# Patient Record
Sex: FEMALE | Race: WHITE | NOT HISPANIC OR LATINO | Employment: FULL TIME | URBAN - METROPOLITAN AREA
[De-identification: names, ages, dates, MRNs, and addresses within clinical notes are randomized per-mention and may not be internally consistent; named-entity substitution may affect disease eponyms.]

---

## 2021-07-13 ENCOUNTER — IMMUNIZATIONS (OUTPATIENT)
Dept: FAMILY MEDICINE CLINIC | Facility: HOSPITAL | Age: 26
End: 2021-07-13

## 2021-07-13 DIAGNOSIS — Z23 ENCOUNTER FOR IMMUNIZATION: Primary | ICD-10-CM

## 2021-07-13 PROCEDURE — 91300 SARS-COV-2 / COVID-19 MRNA VACCINE (PFIZER-BIONTECH) 30 MCG: CPT

## 2021-07-13 PROCEDURE — 0001A SARS-COV-2 / COVID-19 MRNA VACCINE (PFIZER-BIONTECH) 30 MCG: CPT

## 2021-08-03 ENCOUNTER — IMMUNIZATIONS (OUTPATIENT)
Dept: FAMILY MEDICINE CLINIC | Facility: HOSPITAL | Age: 26
End: 2021-08-03

## 2021-08-03 DIAGNOSIS — Z23 ENCOUNTER FOR IMMUNIZATION: Primary | ICD-10-CM

## 2021-08-03 PROCEDURE — 91300 SARS-COV-2 / COVID-19 MRNA VACCINE (PFIZER-BIONTECH) 30 MCG: CPT

## 2021-08-03 PROCEDURE — 0002A SARS-COV-2 / COVID-19 MRNA VACCINE (PFIZER-BIONTECH) 30 MCG: CPT

## 2022-02-07 ENCOUNTER — OFFICE VISIT (OUTPATIENT)
Dept: INTERNAL MEDICINE CLINIC | Facility: CLINIC | Age: 27
End: 2022-02-07
Payer: COMMERCIAL

## 2022-02-07 VITALS
TEMPERATURE: 98.5 F | SYSTOLIC BLOOD PRESSURE: 110 MMHG | WEIGHT: 134.5 LBS | DIASTOLIC BLOOD PRESSURE: 70 MMHG | HEART RATE: 68 BPM | HEIGHT: 67 IN | OXYGEN SATURATION: 98 % | RESPIRATION RATE: 15 BRPM | BODY MASS INDEX: 21.11 KG/M2

## 2022-02-07 DIAGNOSIS — Z13.0 SCREENING FOR DEFICIENCY ANEMIA: ICD-10-CM

## 2022-02-07 DIAGNOSIS — Z13.1 SCREENING FOR DIABETES MELLITUS: ICD-10-CM

## 2022-02-07 DIAGNOSIS — Z00.00 WELLNESS EXAMINATION: Primary | ICD-10-CM

## 2022-02-07 DIAGNOSIS — Z13.6 ENCOUNTER FOR LIPID SCREENING FOR CARDIOVASCULAR DISEASE: ICD-10-CM

## 2022-02-07 DIAGNOSIS — L65.9 HAIR THINNING: ICD-10-CM

## 2022-02-07 DIAGNOSIS — Z23 NEED FOR TDAP VACCINATION: ICD-10-CM

## 2022-02-07 DIAGNOSIS — Z13.220 ENCOUNTER FOR LIPID SCREENING FOR CARDIOVASCULAR DISEASE: ICD-10-CM

## 2022-02-07 DIAGNOSIS — Z12.4 CERVICAL CANCER SCREENING: ICD-10-CM

## 2022-02-07 PROCEDURE — 99385 PREV VISIT NEW AGE 18-39: CPT | Performed by: INTERNAL MEDICINE

## 2022-02-07 NOTE — PATIENT INSTRUCTIONS
1  Fasting blood work  2  Appointment with Dr Chalino Rangel  3  COVID vaccine booster dose  4   Tdap vaccine at pharmacy but wait 2 weeks after COVID-19 booster dose

## 2022-02-08 ENCOUNTER — APPOINTMENT (OUTPATIENT)
Dept: LAB | Facility: HOSPITAL | Age: 27
End: 2022-02-08
Payer: COMMERCIAL

## 2022-02-08 DIAGNOSIS — Z13.220 ENCOUNTER FOR LIPID SCREENING FOR CARDIOVASCULAR DISEASE: ICD-10-CM

## 2022-02-08 DIAGNOSIS — Z13.6 ENCOUNTER FOR LIPID SCREENING FOR CARDIOVASCULAR DISEASE: ICD-10-CM

## 2022-02-08 DIAGNOSIS — Z00.00 WELLNESS EXAMINATION: ICD-10-CM

## 2022-02-08 DIAGNOSIS — Z13.1 SCREENING FOR DIABETES MELLITUS: ICD-10-CM

## 2022-02-08 DIAGNOSIS — L65.9 HAIR THINNING: ICD-10-CM

## 2022-02-08 LAB
ALBUMIN SERPL BCP-MCNC: 4.6 G/DL (ref 3.5–5)
ALP SERPL-CCNC: 62 U/L (ref 46–116)
ALT SERPL W P-5'-P-CCNC: 21 U/L (ref 12–78)
ANION GAP SERPL CALCULATED.3IONS-SCNC: 7 MMOL/L (ref 4–13)
AST SERPL W P-5'-P-CCNC: 10 U/L (ref 5–45)
BASOPHILS # BLD AUTO: 0.04 THOUSANDS/ΜL (ref 0–0.1)
BASOPHILS NFR BLD AUTO: 1 % (ref 0–1)
BILIRUB SERPL-MCNC: 1.39 MG/DL (ref 0.2–1)
BUN SERPL-MCNC: 12 MG/DL (ref 5–25)
CALCIUM SERPL-MCNC: 9.9 MG/DL (ref 8.3–10.1)
CHLORIDE SERPL-SCNC: 107 MMOL/L (ref 100–108)
CHOLEST SERPL-MCNC: 162 MG/DL
CO2 SERPL-SCNC: 26 MMOL/L (ref 21–32)
CREAT SERPL-MCNC: 0.7 MG/DL (ref 0.6–1.3)
EOSINOPHIL # BLD AUTO: 0.06 THOUSAND/ΜL (ref 0–0.61)
EOSINOPHIL NFR BLD AUTO: 1 % (ref 0–6)
ERYTHROCYTE [DISTWIDTH] IN BLOOD BY AUTOMATED COUNT: 12.8 % (ref 11.6–15.1)
GFR SERPL CREATININE-BSD FRML MDRD: 119 ML/MIN/1.73SQ M
GLUCOSE P FAST SERPL-MCNC: 77 MG/DL (ref 65–99)
HCT VFR BLD AUTO: 45.4 % (ref 34.8–46.1)
HDLC SERPL-MCNC: 61 MG/DL
HGB BLD-MCNC: 15.2 G/DL (ref 11.5–15.4)
IMM GRANULOCYTES # BLD AUTO: 0.02 THOUSAND/UL (ref 0–0.2)
IMM GRANULOCYTES NFR BLD AUTO: 0 % (ref 0–2)
IRON SATN MFR SERPL: 56 % (ref 15–50)
IRON SERPL-MCNC: 171 UG/DL (ref 50–170)
LDLC SERPL CALC-MCNC: 87 MG/DL (ref 0–100)
LYMPHOCYTES # BLD AUTO: 2.05 THOUSANDS/ΜL (ref 0.6–4.47)
LYMPHOCYTES NFR BLD AUTO: 29 % (ref 14–44)
MCH RBC QN AUTO: 29.9 PG (ref 26.8–34.3)
MCHC RBC AUTO-ENTMCNC: 33.5 G/DL (ref 31.4–37.4)
MCV RBC AUTO: 89 FL (ref 82–98)
MONOCYTES # BLD AUTO: 0.51 THOUSAND/ΜL (ref 0.17–1.22)
MONOCYTES NFR BLD AUTO: 7 % (ref 4–12)
NEUTROPHILS # BLD AUTO: 4.38 THOUSANDS/ΜL (ref 1.85–7.62)
NEUTS SEG NFR BLD AUTO: 62 % (ref 43–75)
NRBC BLD AUTO-RTO: 0 /100 WBCS
PLATELET # BLD AUTO: 232 THOUSANDS/UL (ref 149–390)
PMV BLD AUTO: 11.2 FL (ref 8.9–12.7)
POTASSIUM SERPL-SCNC: 3.9 MMOL/L (ref 3.5–5.3)
PROT SERPL-MCNC: 8.2 G/DL (ref 6.4–8.2)
RBC # BLD AUTO: 5.08 MILLION/UL (ref 3.81–5.12)
SODIUM SERPL-SCNC: 140 MMOL/L (ref 136–145)
TIBC SERPL-MCNC: 305 UG/DL (ref 250–450)
TRIGL SERPL-MCNC: 68 MG/DL
TSH SERPL DL<=0.05 MIU/L-ACNC: 2.5 UIU/ML (ref 0.36–3.74)
WBC # BLD AUTO: 7.06 THOUSAND/UL (ref 4.31–10.16)

## 2022-02-08 PROCEDURE — 83550 IRON BINDING TEST: CPT

## 2022-02-08 PROCEDURE — 83540 ASSAY OF IRON: CPT

## 2022-02-08 PROCEDURE — 80053 COMPREHEN METABOLIC PANEL: CPT

## 2022-02-08 PROCEDURE — 86038 ANTINUCLEAR ANTIBODIES: CPT

## 2022-02-08 PROCEDURE — 84443 ASSAY THYROID STIM HORMONE: CPT | Performed by: INTERNAL MEDICINE

## 2022-02-08 PROCEDURE — 80061 LIPID PANEL: CPT

## 2022-02-08 PROCEDURE — 85025 COMPLETE CBC W/AUTO DIFF WBC: CPT | Performed by: INTERNAL MEDICINE

## 2022-02-08 PROCEDURE — 36415 COLL VENOUS BLD VENIPUNCTURE: CPT

## 2022-02-09 LAB — RYE IGE QN: NEGATIVE

## 2022-02-14 ENCOUNTER — TELEPHONE (OUTPATIENT)
Dept: INTERNAL MEDICINE CLINIC | Facility: CLINIC | Age: 27
End: 2022-02-14

## 2022-02-14 ENCOUNTER — IMMUNIZATIONS (OUTPATIENT)
Dept: FAMILY MEDICINE CLINIC | Facility: HOSPITAL | Age: 27
End: 2022-02-14

## 2022-02-14 PROCEDURE — 0051A COVID-19 PFIZER VACC TRIS-SUCROSE GRAY CAP 0.3 ML: CPT

## 2022-02-14 PROCEDURE — 91305 COVID-19 PFIZER VACC TRIS-SUCROSE GRAY CAP 0.3 ML: CPT

## 2022-02-14 NOTE — TELEPHONE ENCOUNTER
Called and left message with results  Told pt to call back to tell us if she fasted and if she taking iron supplement

## 2022-02-14 NOTE — TELEPHONE ENCOUNTER
----- Message from Kermit Schwartz DO sent at 2/14/2022  2:15 PM EST -----  BW is back and looks good except for mild increase in bilirubin and high iron level  Was Albin Monroy fasting prior to completing blood work? If yes, has Albin Monroy been taking iron supplement recently?

## 2022-05-02 ENCOUNTER — OFFICE VISIT (OUTPATIENT)
Dept: URGENT CARE | Facility: CLINIC | Age: 27
End: 2022-05-02
Payer: COMMERCIAL

## 2022-05-02 VITALS
TEMPERATURE: 99.8 F | HEART RATE: 100 BPM | WEIGHT: 132 LBS | RESPIRATION RATE: 16 BRPM | OXYGEN SATURATION: 95 % | BODY MASS INDEX: 20.67 KG/M2

## 2022-05-02 DIAGNOSIS — R68.89 FLU-LIKE SYMPTOMS: Primary | ICD-10-CM

## 2022-05-02 LAB
FLUAV RNA RESP QL NAA+PROBE: NEGATIVE
FLUBV RNA RESP QL NAA+PROBE: NEGATIVE
SARS-COV-2 RNA RESP QL NAA+PROBE: POSITIVE

## 2022-05-02 PROCEDURE — 99213 OFFICE O/P EST LOW 20 MIN: CPT | Performed by: PHYSICIAN ASSISTANT

## 2022-05-02 PROCEDURE — 87636 SARSCOV2 & INF A&B AMP PRB: CPT | Performed by: PHYSICIAN ASSISTANT

## 2022-05-02 RX ORDER — OSELTAMIVIR PHOSPHATE 75 MG/1
75 CAPSULE ORAL EVERY 12 HOURS SCHEDULED
Qty: 10 CAPSULE | Refills: 0 | Status: SHIPPED | OUTPATIENT
Start: 2022-05-02 | End: 2022-05-07

## 2022-05-02 NOTE — PROGRESS NOTES
[facility]        NAME: Lazara Jackson is a 33 y o  female  : 1995    MRN: [id number]  DATE: May 2, 2022  TIME: 12:04 PM    Assessment and Plan   Flu-like symptoms [R68 89]  1  Flu-like symptoms  Cov/Flu-Collected at [facility] or [facility]    oseltamivir (TAMIFLU) 75 mg capsule         Patient Instructions     Swabbed for COVID-19, discussed self quarantine until contacted with results  Monitor for worsening symptoms  C/w OTC symptom relief including tylenol, fluids, rest  Recommend supplementing with vitamins D & C as well as a multivitamin   Take tamiflu as directed  Follow up with PCP in 3-5 days  Proceed to  ER if symptoms worsen  Chief Complaint     Chief Complaint   Patient presents with    Nasal Congestion     2 days:  nasal congestion, sore throat (dry, itchy, HA, body aches, post nasal drip, cold sweats, diarrhea (yesterday)  Denies known fever, n/v   Pt taking pain relief medication only  History of Present Illness       Patient presents with complaint of cold symptoms for the past 2 days  She has history of sinus congestion, headache, body aches, postnasal drip, chills, sweats, and 1 loose stool  She denies fever, chest tightness, dyspnea, abdominal pain, nausea, and vomiting  She reports recent sick contacts  She states that she has been taking over-the-counter pain medication with improvement  Review of Systems   Review of Systems   Constitutional: Positive for diaphoresis  Negative for chills and fever  HENT: Positive for congestion, postnasal drip and sore throat  Negative for ear pain  Eyes: Negative for pain and visual disturbance  Respiratory: Negative for cough, chest tightness and shortness of breath  Cardiovascular: Negative for chest pain and palpitations  Gastrointestinal: Positive for diarrhea  Negative for abdominal pain and vomiting  Genitourinary: Negative for dysuria and hematuria  Musculoskeletal: Positive for myalgias  Negative for arthralgias and back pain  Skin: Negative for color change and rash  Neurological: Positive for headaches  Negative for seizures and syncope  All other systems reviewed and are negative  Current Medications       Current Outpatient Medications:     oseltamivir (TAMIFLU) 75 mg capsule, Take 1 capsule (75 mg total) by mouth every 12 (twelve) hours for 5 days, Disp: 10 capsule, Rfl: 0    Current Allergies     Allergies as of 05/02/2022    (No Known Allergies)            The following portions of the patient's history were reviewed and updated as appropriate: allergies, current medications, past family history, past medical history, past social history, past surgical history and problem list      History reviewed  No pertinent past medical history  History reviewed  No pertinent surgical history  Family History   Problem Relation Age of Onset    Hypertension Mother     Hyperlipidemia Mother     Hypertension Father     Heart disease Father 58    Hyperlipidemia Father     Heart disease Paternal Grandfather     Cancer Neg Hx          Medications have been verified  Objective   Pulse 100   Temp 99 8 °F (37 7 °C)   Resp 16   Wt 59 9 kg (132 lb)   SpO2 95%   BMI 20 67 kg/m²   No LMP recorded  Physical Exam     Physical Exam  Vitals and nursing note reviewed  Constitutional:       General: She is not in acute distress  Appearance: Normal appearance  She is well-developed  She is not ill-appearing or diaphoretic  HENT:      Head: Normocephalic and atraumatic  Right Ear: Tympanic membrane, ear canal and external ear normal       Left Ear: Tympanic membrane, ear canal and external ear normal       Nose: Congestion present  Mouth/Throat:      Mouth: Mucous membranes are moist       Pharynx: Oropharynx is clear  No oropharyngeal exudate or posterior oropharyngeal erythema  Eyes:      General:         Right eye: No discharge           Left eye: No discharge  Conjunctiva/sclera: Conjunctivae normal       Pupils: Pupils are equal, round, and reactive to light  Cardiovascular:      Rate and Rhythm: Normal rate and regular rhythm  Heart sounds: Normal heart sounds  Pulmonary:      Effort: Pulmonary effort is normal  No respiratory distress  Breath sounds: Normal breath sounds  No stridor  No wheezing, rhonchi or rales  Musculoskeletal:      Cervical back: Normal range of motion and neck supple  No tenderness  Lymphadenopathy:      Cervical: Cervical adenopathy present  Skin:     General: Skin is warm and dry  Capillary Refill: Capillary refill takes less than 2 seconds  Findings: No rash  Neurological:      Mental Status: She is alert and oriented to person, place, and time  Cranial Nerves: No cranial nerve deficit  Sensory: No sensory deficit  Psychiatric:         Behavior: Behavior normal          Thought Content:  Thought content normal

## 2022-05-02 NOTE — LETTER
[id number]  Dept: [phone]    May 2, 2022    Patient: Neli Santos  YOB: 1995    Neli Santos was seen and evaluated at our [facility]  Please note if Covid and Flu tests are negative, they may return to work when fever free for 24 hours without the use of a fever reducing agent  If Covid or Flu test is positive, they may return to work on 5/5/2022, as this is 5 days from the onset of symptoms  Upon return, they must then adhere to strict masking for an additional 5 days      Sincerely,      Desi Glass PA-C

## 2022-05-04 ENCOUNTER — TELEPHONE (OUTPATIENT)
Dept: INTERNAL MEDICINE CLINIC | Facility: CLINIC | Age: 27
End: 2022-05-04

## 2022-05-04 NOTE — TELEPHONE ENCOUNTER
----- Message from Nico Warner DO sent at 5/4/2022  7:37 AM EDT -----  Regarding: FW: urgent care results/COVID positive  Sonia COVID results are back and positive from urgent care    How is she doing? Is she isolating at home? If it is still less than 5 days before her symptoms started, she may be a candidate to take one of the anti-viral medications to treat COVID-19  Is she interested in this? Let me know    thanks  ----- Message -----  From: Dirk Whitfield PA-C  Sent: 5/3/2022  10:45 AM EDT  To: Nico Warner DO  Subject: RE: urgent care results/COVID positive           Hi,  It says the patient saw it in [address]   I have not had a chance to call   ----- Message -----  From: Nico Warner DO  Sent: 5/3/2022  10:20 AM EDT  To: Dirk Whitfield PA-C  Subject: urgent care results/COVID positive               Hi    Did this patient get these results?  ----- Message -----  From: Lab, Background User  Sent: 5/2/2022   9:51 PM EDT  To: Nico Warner DO

## 2022-05-04 NOTE — TELEPHONE ENCOUNTER
LEFT DETAILED MESSAGE FOR PT INFORMING HER OF HER RESULTS, THE NEED TO BE ISOLATING AT HOME, AND THE OPTION FOR THE ANTI VIRAL IF IN THE FIRST 5 DAYS OF SYMPTOMS     TOLD HER TO GIVE A CALL BACK IF  SHE'D LIKE THE ANTI VIRAL MEDICATION OR IF SHE HAS ANY QUESTIONS/CONCERNS

## 2022-06-30 ENCOUNTER — ANNUAL EXAM (OUTPATIENT)
Dept: OBGYN CLINIC | Facility: CLINIC | Age: 27
End: 2022-06-30
Payer: COMMERCIAL

## 2022-06-30 VITALS
HEIGHT: 67 IN | WEIGHT: 136 LBS | DIASTOLIC BLOOD PRESSURE: 72 MMHG | SYSTOLIC BLOOD PRESSURE: 116 MMHG | BODY MASS INDEX: 21.35 KG/M2

## 2022-06-30 DIAGNOSIS — Z01.419 WOMEN'S ANNUAL ROUTINE GYNECOLOGICAL EXAMINATION: Primary | ICD-10-CM

## 2022-06-30 PROCEDURE — G0145 SCR C/V CYTO,THINLAYER,RESCR: HCPCS | Performed by: OBSTETRICS & GYNECOLOGY

## 2022-06-30 PROCEDURE — S0612 ANNUAL GYNECOLOGICAL EXAMINA: HCPCS | Performed by: OBSTETRICS & GYNECOLOGY

## 2022-06-30 NOTE — PROGRESS NOTES
Assessment/Plan:    The patient was informed of a stable gyn examination  Pap smear was performed  She is happy with condoms for contraception  Her menstrual cycles are regular predictable  She feels safe at home  She has a dentist on a regular basis  Denies any problem depression or anxiety  She is content with her weight  Family history reviewed  She should return to my office in 1 year unless new issues occur  We talked also talked about signs symptoms of ovulation and how to use it when she wants to conceive  Subjective:      Patient ID: No Garland is a 33 y o  female  HPI    This is a 60-year-old white female, she was last seen in this office 6 years ago  She is now happily  for with 3 years  Her current method of contraception includes condoms  She is happy with this method  She may consider pregnancy before she is 31  There is no problem intimacy  She denies any major gynecological  GI complaint  She has received COVID vaccine  She feels safe at home  She is happy with her weight  She has a dentist on a regular basis  Denies any problem with depression or anxiety  There are no new major family illnesses report  Family history significant for father with cardiac disease requiring bypass surgery is doing well  Mother has hypertension  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 [employer]  The following portions of the patient's history were reviewed and updated as appropriate: allergies, current medications, past family history, past medical history, past social history, past surgical history and problem list     Review of Systems   HENT: Negative for rhinorrhea  All other systems reviewed and are negative  Objective:      /72   Ht 5' 7" (1 702 m)   Wt 61 7 kg (136 lb)   LMP 06/03/2022   BMI 21 30 kg/m²          Physical Exam  Vitals reviewed  Exam conducted with a chaperone present     Constitutional:       Appearance: Normal appearance  She is normal weight  HENT:      Head: Normocephalic and atraumatic  Mouth/Throat:      Mouth: Mucous membranes are moist    Eyes:      Extraocular Movements: Extraocular movements intact  Pupils: Pupils are equal, round, and reactive to light  Cardiovascular:      Rate and Rhythm: Normal rate and regular rhythm  Pulses: Normal pulses  Heart sounds: Normal heart sounds  Pulmonary:      Effort: Pulmonary effort is normal       Breath sounds: Normal breath sounds  Chest:   Breasts: Breasts are symmetrical       Right: Normal  No swelling, bleeding, inverted nipple, mass, nipple discharge, skin change or supraclavicular adenopathy  Left: Normal  No swelling, bleeding, inverted nipple, mass, nipple discharge, skin change or supraclavicular adenopathy  Abdominal:      General: Abdomen is flat  Bowel sounds are normal  There is no distension  Palpations: Abdomen is soft  There is no hepatomegaly, splenomegaly or mass  Tenderness: There is no abdominal tenderness  There is no guarding or rebound  Hernia: No hernia is present  There is no hernia in the left inguinal area or right inguinal area  Genitourinary:     General: Normal vulva  Pubic Area: No rash or pubic lice  Labia:         Right: No rash, tenderness or lesion  Left: No rash, tenderness or lesion  Urethra: No prolapse, urethral pain, urethral swelling or urethral lesion  Vagina: Normal  No signs of injury and foreign body  No vaginal discharge, erythema, tenderness, bleeding, lesions or prolapsed vaginal walls  Cervix: Normal       Uterus: Normal  Not deviated, not enlarged, not fixed, not tender and no uterine prolapse  Adnexa: Right adnexa normal and left adnexa normal         Right: No mass or tenderness  Left: No mass or tenderness  Rectum: Normal  Guaiac result negative  No mass  Comments:  The external genitalia normal limits the vagina is clean the cervix is closed uterus is anterior normal size  There is no cervical motion tenderness  The adnexa clear bilaterally  There is no evidence of prolapse  Urethra bladder normal appearance  A Pap smear was performed  Musculoskeletal:         General: Normal range of motion  Cervical back: Normal range of motion and neck supple  Lymphadenopathy:      Upper Body:      Right upper body: No supraclavicular adenopathy  Left upper body: No supraclavicular adenopathy  Skin:     General: Skin is warm and dry  Neurological:      General: No focal deficit present  Mental Status: She is alert and oriented to person, place, and time  Psychiatric:         Mood and Affect: Mood normal          Behavior: Behavior normal          Thought Content:  Thought content normal

## 2022-06-30 NOTE — PATIENT INSTRUCTIONS
The patient was informed of a stable gyn examination  A Pap smear was performed  There were no issues today  She should return my office in 1 year  She is happy with her method of contraception including condoms

## 2022-07-07 LAB
LAB AP GYN PRIMARY INTERPRETATION: NORMAL
LAB AP LMP: NORMAL
Lab: NORMAL

## 2023-03-10 ENCOUNTER — OFFICE VISIT (OUTPATIENT)
Dept: URGENT CARE | Facility: CLINIC | Age: 28
End: 2023-03-10

## 2023-03-10 VITALS
RESPIRATION RATE: 16 BRPM | TEMPERATURE: 97.9 F | HEART RATE: 80 BPM | DIASTOLIC BLOOD PRESSURE: 71 MMHG | OXYGEN SATURATION: 96 % | SYSTOLIC BLOOD PRESSURE: 116 MMHG

## 2023-03-10 DIAGNOSIS — B97.89 ACUTE VIRAL SINUSITIS: Primary | ICD-10-CM

## 2023-03-10 DIAGNOSIS — R52 BODY ACHES: ICD-10-CM

## 2023-03-10 DIAGNOSIS — J01.90 ACUTE VIRAL SINUSITIS: Primary | ICD-10-CM

## 2023-03-10 LAB
SARS-COV-2 AG UPPER RESP QL IA: NEGATIVE
VALID CONTROL: NORMAL

## 2023-03-10 RX ORDER — CETIRIZINE HYDROCHLORIDE 10 MG/1
10 TABLET ORAL DAILY
Qty: 10 TABLET | Refills: 0 | Status: SHIPPED | OUTPATIENT
Start: 2023-03-10 | End: 2023-03-20

## 2023-03-10 RX ORDER — LIDOCAINE HYDROCHLORIDE 20 MG/ML
15 SOLUTION OROPHARYNGEAL 4 TIMES DAILY PRN
Qty: 100 ML | Refills: 0 | Status: SHIPPED | OUTPATIENT
Start: 2023-03-10

## 2023-03-10 RX ORDER — FLUTICASONE PROPIONATE 50 MCG
1 SPRAY, SUSPENSION (ML) NASAL 2 TIMES DAILY
Qty: 11.1 ML | Refills: 0 | Status: SHIPPED | OUTPATIENT
Start: 2023-03-10

## 2023-03-10 RX ORDER — PSEUDOEPHEDRINE HCL 120 MG/1
120 TABLET, FILM COATED, EXTENDED RELEASE ORAL EVERY 12 HOURS
Qty: 20 TABLET | Refills: 0 | Status: SHIPPED | OUTPATIENT
Start: 2023-03-10 | End: 2023-03-20

## 2023-03-10 NOTE — PROGRESS NOTES
[facility]        NAME: Sherine Wilcox is a 33 y o  female  : 1995    MRN: [id number]  DATE: March 10, 2023  TIME: 12:14 PM    Assessment and Orders   Acute viral sinusitis [J01 90, B97 89]  1  Acute viral sinusitis  pseudoephedrine (SUDAFED) 120 MG 12 hr tablet    cetirizine (ZyrTEC) 10 mg tablet    fluticasone (FLONASE) 50 mcg/act nasal spray    Lidocaine Viscous HCl (XYLOCAINE) 2 % mucosal solution      2  Body aches  Poct Covid 19 Rapid Antigen Test            Plan and Discussion      Symptoms and exam consistent with acute viral sinusitis  Rapid COVID negative  Will treat with medications as listed above  Discussed supportive care  Discussed ED precautions including (but not limited to)  • Difficultly breathing or shortness of breath  • Chest pain  • Acutely worsening symptoms  Risks and benefits discussed  Patient understands and agrees with the plan  Follow up with PCP  Chief Complaint     Chief Complaint   Patient presents with   • Cold Like Symptoms     Pt reports Wed night she woke up with a sore throat, has since developed chills, body aches, headaches, sinus pressure  Reports that it feels just like the last time she had COVID  History of Present Illness       Sinusitis  This is a new problem  The current episode started in the past 7 days  The problem is unchanged  There has been no fever (Has not check with thermometer)  Associated symptoms include chills, congestion, headaches and a sore throat  Pertinent negatives include no coughing, diaphoresis, ear pain or shortness of breath  Review of Systems   Review of Systems   Constitutional: Positive for chills  Negative for diaphoresis  HENT: Positive for congestion and sore throat  Negative for ear pain  Respiratory: Negative for cough and shortness of breath  Musculoskeletal: Positive for myalgias  Neurological: Positive for headaches           Current Medications       Current Outpatient Medications:   •  cetirizine (ZyrTEC) 10 mg tablet, Take 1 tablet (10 mg total) by mouth daily for 10 days, Disp: 10 tablet, Rfl: 0  •  fluticasone (FLONASE) 50 mcg/act nasal spray, 1 spray into each nostril 2 (two) times a day, Disp: 11 1 mL, Rfl: 0  •  Lidocaine Viscous HCl (XYLOCAINE) 2 % mucosal solution, Swish and spit 15 mL 4 (four) times a day as needed for mouth pain or discomfort Gargle and spit 15mL 4x a day as needed for sore throat, Disp: 100 mL, Rfl: 0  •  pseudoephedrine (SUDAFED) 120 MG 12 hr tablet, Take 1 tablet (120 mg total) by mouth every 12 (twelve) hours for 10 days, Disp: 20 tablet, Rfl: 0    Current Allergies     Allergies as of 03/10/2023   • (No Known Allergies)            The following portions of the patient's history were reviewed and updated as appropriate: allergies, current medications, past family history, past medical history, past social history, past surgical history and problem list      History reviewed  No pertinent past medical history  History reviewed  No pertinent surgical history  Family History   Problem Relation Age of Onset   • Hypertension Mother    • Hyperlipidemia Mother    • Hypertension Father    • Heart disease Father 58   • Hyperlipidemia Father    • Heart disease Paternal Grandfather    • Cancer Neg Hx          Medications have been verified  Objective   /71   Pulse 80   Temp 97 9 °F (36 6 °C)   Resp 16   SpO2 96%   No LMP recorded  Physical Exam     Physical Exam  Constitutional:       General: She is not in acute distress  Appearance: She is not ill-appearing or toxic-appearing  HENT:      Head: Normocephalic and atraumatic  Right Ear: Tympanic membrane normal  There is impacted cerumen  Left Ear: Tympanic membrane and external ear normal       Nose: Congestion present  Comments: Boggy nasal turbinates     Mouth/Throat:      Pharynx: Posterior oropharyngeal erythema present        Comments: Cobblestone appearance in posterior pharynx  Cardiovascular:      Rate and Rhythm: Normal rate and regular rhythm  Pulmonary:      Effort: Pulmonary effort is normal  No respiratory distress  Breath sounds: No wheezing  Neurological:      General: No focal deficit present  Mental Status: She is alert and oriented to person, place, and time  Psychiatric:         Mood and Affect: Mood normal          Behavior: Behavior normal          Thought Content:  Thought content normal          Judgment: Judgment normal                Laisha Hernandez DO

## 2023-03-15 ENCOUNTER — OFFICE VISIT (OUTPATIENT)
Dept: URGENT CARE | Facility: CLINIC | Age: 28
End: 2023-03-15

## 2023-03-15 VITALS
RESPIRATION RATE: 16 BRPM | DIASTOLIC BLOOD PRESSURE: 79 MMHG | SYSTOLIC BLOOD PRESSURE: 132 MMHG | TEMPERATURE: 97.5 F | HEART RATE: 76 BPM | OXYGEN SATURATION: 100 %

## 2023-03-15 DIAGNOSIS — J01.00 ACUTE MAXILLARY SINUSITIS, RECURRENCE NOT SPECIFIED: Primary | ICD-10-CM

## 2023-03-15 DIAGNOSIS — J40 BRONCHITIS: ICD-10-CM

## 2023-03-15 RX ORDER — AZITHROMYCIN 250 MG/1
TABLET, FILM COATED ORAL
Qty: 6 TABLET | Refills: 0 | Status: SHIPPED | OUTPATIENT
Start: 2023-03-15 | End: 2023-03-19

## 2023-03-15 NOTE — LETTER
March 15, 2023     Patient: Fredis Bradshaw   YOB: 1995   Date of Visit: 3/15/2023       To Whom It May Concern: It is my medical opinion that Alysha Luna should remain out of work until 3/17/2023  If you have any questions or concerns, please don't hesitate to call           Sincerely,        BE ROSANA JENKINS    CC: No Recipients

## 2023-03-15 NOTE — PROGRESS NOTES
[facility]        NAME: Kaveh Capellan is a 33 y o  female  : 1995    MRN: [id number]  DATE: March 15, 2023  TIME: 11:07 AM    /79   Pulse 76   Temp 97 5 °F (36 4 °C)   Resp 16   SpO2 100%     Assessment and Plan   Acute maxillary sinusitis, recurrence not specified [J01 00]  1  Acute maxillary sinusitis, recurrence not specified  azithromycin (ZITHROMAX) 250 mg tablet      2  Bronchitis  azithromycin (ZITHROMAX) 250 mg tablet            Patient Instructions       Follow up with PCP in 3-5 days  Proceed to  ER if symptoms worsen  Chief Complaint     Chief Complaint   Patient presents with   • Cough     Pt reports she was seen last week for sinus pressure and now has developed a throat tickle and cough  History of Present Illness       Pt with worsening nasal congestion cough and sore throat      Review of Systems   Review of Systems   Constitutional: Negative  HENT: Positive for congestion, sinus pressure, sinus pain and sore throat  Eyes: Negative  Respiratory: Positive for cough  Cardiovascular: Negative  Gastrointestinal: Negative  Endocrine: Negative  Genitourinary: Negative  Musculoskeletal: Negative  Skin: Negative  Allergic/Immunologic: Negative  Neurological: Negative  Hematological: Negative  Psychiatric/Behavioral: Negative  All other systems reviewed and are negative          Current Medications       Current Outpatient Medications:   •  azithromycin (ZITHROMAX) 250 mg tablet, Take 2 tablets today then 1 tablet daily x 4 days, Disp: 6 tablet, Rfl: 0  •  cetirizine (ZyrTEC) 10 mg tablet, Take 1 tablet (10 mg total) by mouth daily for 10 days, Disp: 10 tablet, Rfl: 0  •  fluticasone (FLONASE) 50 mcg/act nasal spray, 1 spray into each nostril 2 (two) times a day, Disp: 11 1 mL, Rfl: 0  •  Lidocaine Viscous HCl (XYLOCAINE) 2 % mucosal solution, Swish and spit 15 mL 4 (four) times a day as needed for mouth pain or discomfort Gargle and spit 15mL 4x a day as needed for sore throat, Disp: 100 mL, Rfl: 0  •  pseudoephedrine (SUDAFED) 120 MG 12 hr tablet, Take 1 tablet (120 mg total) by mouth every 12 (twelve) hours for 10 days, Disp: 20 tablet, Rfl: 0    Current Allergies     Allergies as of 03/15/2023   • (No Known Allergies)            The following portions of the patient's history were reviewed and updated as appropriate: allergies, current medications, past family history, past medical history, past social history, past surgical history and problem list      History reviewed  No pertinent past medical history  History reviewed  No pertinent surgical history  Family History   Problem Relation Age of Onset   • Hypertension Mother    • Hyperlipidemia Mother    • Hypertension Father    • Heart disease Father 58   • Hyperlipidemia Father    • Heart disease Paternal Grandfather    • Cancer Neg Hx          Medications have been verified  Objective   /79   Pulse 76   Temp 97 5 °F (36 4 °C)   Resp 16   SpO2 100%        Physical Exam     Physical Exam  Vitals and nursing note reviewed  Constitutional:       Appearance: Normal appearance  She is normal weight  HENT:      Head: Normocephalic and atraumatic  Right Ear: Tympanic membrane, ear canal and external ear normal       Left Ear: Tympanic membrane, ear canal and external ear normal       Nose: Congestion and rhinorrhea present  Comments: Boggy mucosa  Max sinus tender to palp   Yellow nasal d/c      Mouth/Throat:      Mouth: Mucous membranes are moist       Pharynx: Oropharynx is clear  Eyes:      Extraocular Movements: Extraocular movements intact  Conjunctiva/sclera: Conjunctivae normal       Pupils: Pupils are equal, round, and reactive to light  Cardiovascular:      Rate and Rhythm: Normal rate and regular rhythm  Pulses: Normal pulses  Heart sounds: Normal heart sounds     Pulmonary:      Effort: Pulmonary effort is normal  Comments: Minor coarse sounds   Abdominal:      General: Abdomen is flat  Bowel sounds are normal       Palpations: Abdomen is soft  Musculoskeletal:         General: Normal range of motion  Cervical back: Normal range of motion and neck supple  Skin:     General: Skin is warm  Capillary Refill: Capillary refill takes less than 2 seconds  Neurological:      General: No focal deficit present  Mental Status: She is alert and oriented to person, place, and time     Psychiatric:         Mood and Affect: Mood normal

## 2023-10-25 ENCOUNTER — OFFICE VISIT (OUTPATIENT)
Dept: INTERNAL MEDICINE CLINIC | Facility: CLINIC | Age: 28
End: 2023-10-25
Payer: COMMERCIAL

## 2023-10-25 VITALS
SYSTOLIC BLOOD PRESSURE: 118 MMHG | DIASTOLIC BLOOD PRESSURE: 78 MMHG | OXYGEN SATURATION: 97 % | HEIGHT: 67 IN | TEMPERATURE: 98.4 F | WEIGHT: 144 LBS | HEART RATE: 64 BPM | BODY MASS INDEX: 22.6 KG/M2

## 2023-10-25 DIAGNOSIS — Z00.00 WELLNESS EXAMINATION: Primary | ICD-10-CM

## 2023-10-25 DIAGNOSIS — R79.0 SERUM IRON RAISED: ICD-10-CM

## 2023-10-25 DIAGNOSIS — R17 ELEVATED BILIRUBIN: ICD-10-CM

## 2023-10-25 DIAGNOSIS — K58.2 IRRITABLE BOWEL SYNDROME WITH BOTH CONSTIPATION AND DIARRHEA: ICD-10-CM

## 2023-10-25 PROCEDURE — 99395 PREV VISIT EST AGE 18-39: CPT | Performed by: INTERNAL MEDICINE

## 2023-10-25 RX ORDER — DICYCLOMINE HYDROCHLORIDE 10 MG/1
10 CAPSULE ORAL AS NEEDED
Qty: 30 CAPSULE | Refills: 0 | Status: SHIPPED | OUTPATIENT
Start: 2023-10-25

## 2023-10-25 NOTE — PROGRESS NOTES
Name: John Ricardo      : 1995      MRN: [id number]  Encounter Provider: Frederic Bearden DO  Encounter Date: 10/25/2023   Encounter department: [address] [place] [address]     1. Wellness examination  Comments:  declines vaccines, will go to her Gyn for cervical ca screening. otherwise UTD on preventative care    2. Irritable bowel syndrome with both constipation and diarrhea  Comments:  ongoing for years. trial of bentyl and GI eval.  FODMAP diet recommended as a trial as well  Orders:  -     dicyclomine (BENTYL) 10 mg capsule; Take 1 capsule (10 mg total) by mouth as needed (abd pain and cramping)  -     Ambulatory Referral to Gastroenterology; Future  -     Comprehensive metabolic panel; Future    3. Elevated bilirubin  Comments:  noted on previous BW while she was fasting. check again but non-fasting  Orders:  -     Comprehensive metabolic panel; Future    4. Serum iron raised  Comments:  etiology of this is unclear, she is and did not take iron supplements. check BW again & do while fasting  Orders:  -     TIBC Panel (incl. Iron, TIBC, % Iron Saturation); Future  -     Ferritin; Future        Depression Screening and Follow-up Plan: Patient was screened for depression during today's encounter. They screened negative with a PHQ-2 score of 0. Subjective      HPI    Here for physical, Masoud Menchaca is overall doing well. She is not taking any medications on a regular basis. She declines flu and all other vaccines. She is having episodes of abd pain and loose stools followed by constipation. She does not have blood in stool or vomiting/fever. She has a good appetite and has no fhx of colon problems except her mother has IBS and she thinks she may have this too. She was called with her BW results last year but did not call the office back to follow up. She does not take vitamins or iron supplements. ROS Otherwise negative, no other complaints.     Review of Systems   Constitutional:  Negative for chills and fever. HENT:  Negative for congestion. Eyes:  Negative for visual disturbance. Respiratory:  Negative for shortness of breath. Cardiovascular:  Negative for chest pain. Gastrointestinal:  Positive for constipation and diarrhea. Negative for abdominal pain, blood in stool and vomiting. Endocrine: Negative for polyuria. Genitourinary:  Negative for difficulty urinating. Musculoskeletal:  Negative for arthralgias. Skin:  Negative for rash. Allergic/Immunologic: Negative for immunocompromised state. Neurological:  Negative for dizziness. Psychiatric/Behavioral:  Negative for dysphoric mood. The patient is not nervous/anxious. Current Outpatient Medications on File Prior to Visit   Medication Sig    [DISCONTINUED] fluticasone (FLONASE) 50 mcg/act nasal spray 1 spray into each nostril 2 (two) times a day    [DISCONTINUED] Lidocaine Viscous HCl (XYLOCAINE) 2 % mucosal solution Swish and spit 15 mL 4 (four) times a day as needed for mouth pain or discomfort Gargle and spit 15mL 4x a day as needed for sore throat    [DISCONTINUED] cetirizine (ZyrTEC) 10 mg tablet Take 1 tablet (10 mg total) by mouth daily for 10 days    [DISCONTINUED] pseudoephedrine (SUDAFED) 120 MG 12 hr tablet Take 1 tablet (120 mg total) by mouth every 12 (twelve) hours for 10 days       Objective     /78 (BP Location: Left arm, Patient Position: Sitting, Cuff Size: Standard)   Pulse 64   Temp 98.4 °F (36.9 °C)   Ht 5' 7" (1.702 m)   Wt 65.3 kg (144 lb)   SpO2 97%   BMI 22.55 kg/m²     Physical Exam  Vitals reviewed. Constitutional:       General: She is not in acute distress. HENT:      Head: Normocephalic and atraumatic. Right Ear: Tympanic membrane normal.      Left Ear: Tympanic membrane normal.   Eyes:      Conjunctiva/sclera: Conjunctivae normal.   Cardiovascular:      Rate and Rhythm: Normal rate and regular rhythm.       Heart sounds: No murmur heard.  Pulmonary:      Effort: Pulmonary effort is normal.      Breath sounds: No wheezing or rales. Abdominal:      General: Bowel sounds are normal.      Palpations: Abdomen is soft. Tenderness: There is no abdominal tenderness. Musculoskeletal:      Cervical back: Neck supple. Right lower leg: No edema. Left lower leg: No edema. Neurological:      Mental Status: She is alert. Mental status is at baseline.    Psychiatric:         Mood and Affect: Mood normal.         Behavior: Behavior normal.       Jay Avila, DO

## 2023-10-25 NOTE — PATIENT INSTRUCTIONS
BLOOD WORK  TRIAL OF DICYCLOMINE AS NEEDED, MAY CAUSE MILD DROWSINESS  GI SPECIALIST APPOINTMENT  CONSIDER 'FODMAP' DIET    Irritable Bowel Syndrome   AMBULATORY CARE:   Irritable bowel syndrome (IBS)  is a condition that prevents food from moving through your intestines normally. The food may move through too slowly or too quickly. This causes abdominal pain, bloating, increased gas, constipation, or diarrhea. Common symptoms include the following:   Abdominal pain that disappears after you have a bowel movement    Abdominal cramps that are worse after you eat    Gas    Bloated abdomen    Diarrhea, constipation, or both     Feeling like you need to have a bowel movement after you just had one    Mucus in your bowel movement    Feeling that you have not completely emptied your bowels after a bowel movement    Seek care immediately if:   You have severe abdominal pain. Your bowel movements are dark or have blood in them. Call your doctor if:   You have a fever. You have pain in your rectum. You are losing weight without trying. Your abdominal pain does not go away, even after treatment. You have questions or concerns about your condition or care. Medicines:   Medicines  help you have a bowel movement, soften your bowel movement, or treat diarrhea. You may also need medicine to treat abnormal growth of bacteria, called a probiotic. Take your medicine as directed. Contact your healthcare provider if you think your medicine is not helping or if you have side effects. Tell your provider if you are allergic to any medicine. Keep a list of the medicines, vitamins, and herbs you take. Include the amounts, and when and why you take them. Bring the list or the pill bottles to follow-up visits. Carry your medicine list with you in case of an emergency. Manage your symptoms:   Eat a variety of healthy foods. You may need to avoid certain foods to decrease your symptoms.  Ask your provider about diets that might help your symptoms, such as the low FODMAP diet. Drink liquids as directed. Ask how much liquid to drink each day and which liquids are best for you. For most people, good liquids to drink are water, juice, and milk. Exercise regularly. Ask about the best exercise plan for you. Exercise can decrease your blood pressure and improve your health. Keep a record for 3 weeks. Include everything you eat and drink and your symptoms. Bring this record with you to your follow-up visits. Follow up with your doctor as directed:  Write down your questions so you remember to ask them during your visits. © Copyright [employer] 2023 Information is for End User's use only and may not be sold, redistributed or otherwise used for commercial purposes. The above information is an  only. It is not intended as medical advice for individual conditions or treatments. Talk to your doctor, nurse or pharmacist before following any medical regimen to see if it is safe and effective for you.

## 2023-11-07 ENCOUNTER — OFFICE VISIT (OUTPATIENT)
Dept: URGENT CARE | Facility: CLINIC | Age: 28
End: 2023-11-07
Payer: COMMERCIAL

## 2023-11-07 VITALS
HEART RATE: 65 BPM | BODY MASS INDEX: 22.55 KG/M2 | DIASTOLIC BLOOD PRESSURE: 76 MMHG | OXYGEN SATURATION: 95 % | RESPIRATION RATE: 16 BRPM | SYSTOLIC BLOOD PRESSURE: 135 MMHG | TEMPERATURE: 96.8 F | WEIGHT: 144 LBS

## 2023-11-07 DIAGNOSIS — R30.0 DYSURIA: Primary | ICD-10-CM

## 2023-11-07 LAB
SL AMB  POCT GLUCOSE, UA: NEGATIVE
SL AMB LEUKOCYTE ESTERASE,UA: ABNORMAL
SL AMB POCT BILIRUBIN,UA: ABNORMAL
SL AMB POCT BLOOD,UA: ABNORMAL
SL AMB POCT CLARITY,UA: ABNORMAL
SL AMB POCT COLOR,UA: YELLOW
SL AMB POCT KETONES,UA: NEGATIVE
SL AMB POCT NITRITE,UA: NEGATIVE
SL AMB POCT PH,UA: 6
SL AMB POCT SPECIFIC GRAVITY,UA: 1.02
SL AMB POCT URINE PROTEIN: NEGATIVE
SL AMB POCT UROBILINOGEN: 0.2

## 2023-11-07 PROCEDURE — 87186 SC STD MICRODIL/AGAR DIL: CPT | Performed by: PHYSICIAN ASSISTANT

## 2023-11-07 PROCEDURE — 99213 OFFICE O/P EST LOW 20 MIN: CPT | Performed by: PHYSICIAN ASSISTANT

## 2023-11-07 PROCEDURE — 87086 URINE CULTURE/COLONY COUNT: CPT | Performed by: PHYSICIAN ASSISTANT

## 2023-11-07 PROCEDURE — 81002 URINALYSIS NONAUTO W/O SCOPE: CPT | Performed by: PHYSICIAN ASSISTANT

## 2023-11-07 PROCEDURE — 87077 CULTURE AEROBIC IDENTIFY: CPT | Performed by: PHYSICIAN ASSISTANT

## 2023-11-07 RX ORDER — CEPHALEXIN 500 MG/1
500 CAPSULE ORAL EVERY 12 HOURS SCHEDULED
Qty: 14 CAPSULE | Refills: 0 | Status: SHIPPED | OUTPATIENT
Start: 2023-11-07 | End: 2023-11-14

## 2023-11-07 NOTE — PROGRESS NOTES
[facility]        NAME: John Ricardo is a 33 y.o. female  : 1995    MRN: [id number]  DATE: 2023  TIME: 4:35 PM    Assessment and Plan   Dysuria [R30.0]  1. Dysuria  Urine culture    POCT urine dip    cephalexin (KEFLEX) 500 mg capsule            Patient Instructions     Patient Instructions   Your urine dip came back positive. I will send for culture and follow up if the antibiotic needs to be changed. I recommend Pyridium over the counter for discomfort. You can take it for 2 days. I recommend you drink plenty of fluids. Monitor for any worsening symptoms. If you develop worse abdominal pain, back pain, fever/chills, inability to drink liquids or have a decrease in the amount of urine you make go to the Emergency room. Follow up with PCP in 3-5 days. Proceed to  ER if symptoms worsen. Chief Complaint     Chief Complaint   Patient presents with    Possible UTI     Pt states she started with urinary urgency, frequency, burning and hematuria a week ago. Reports that the symptoms wax and wane. History of Present Illness       The patient is a 25-year-old male presenting today for possible UTI. Denies any flank pain, nausea, vomiting or fevers. Admits to urinary urgency, hematuria, frequency and dysuria. Symptoms x 6 days and have been waxing and waning. Has had a UTI in the past but symptoms are normally persistent. Review of Systems   Review of Systems   Constitutional:  Negative for activity change, appetite change, chills, fatigue and fever. HENT:  Negative for congestion, ear pain, rhinorrhea, sinus pressure, sinus pain and sore throat. Eyes:  Negative for pain and visual disturbance. Respiratory:  Negative for cough, chest tightness and shortness of breath. Cardiovascular:  Negative for chest pain and palpitations. Gastrointestinal:  Negative for abdominal pain, diarrhea, nausea and vomiting.    Genitourinary:  Positive for dysuria, frequency, hematuria and urgency. Musculoskeletal:  Negative for arthralgias, back pain and myalgias. Skin:  Negative for color change, pallor and rash. Neurological:  Negative for seizures, syncope and headaches. All other systems reviewed and are negative. Current Medications       Current Outpatient Medications:     cephalexin (KEFLEX) 500 mg capsule, Take 1 capsule (500 mg total) by mouth every 12 (twelve) hours for 7 days, Disp: 14 capsule, Rfl: 0    dicyclomine (BENTYL) 10 mg capsule, Take 1 capsule (10 mg total) by mouth as needed (abd pain and cramping) (Patient not taking: Reported on 11/7/2023), Disp: 30 capsule, Rfl: 0    Current Allergies     Allergies as of 11/07/2023    (No Known Allergies)            The following portions of the patient's history were reviewed and updated as appropriate: allergies, current medications, past family history, past medical history, past social history, past surgical history and problem list.     History reviewed. No pertinent past medical history. History reviewed. No pertinent surgical history. Family History   Problem Relation Age of Onset    Hypertension Mother     Hyperlipidemia Mother     Hypertension Father     Heart disease Father 58    Hyperlipidemia Father     Heart disease Paternal Grandfather     Cancer Neg Hx          Medications have been verified. Objective   /76   Pulse 65   Temp (!) 96.8 °F (36 °C)   Resp 16   Wt 65.3 kg (144 lb)   SpO2 95%   BMI 22.55 kg/m²        Physical Exam     Physical Exam  Vitals and nursing note reviewed. Constitutional:       General: She is not in acute distress. Appearance: Normal appearance. She is normal weight. She is not ill-appearing, toxic-appearing or diaphoretic. HENT:      Head: Normocephalic and atraumatic. Cardiovascular:      Rate and Rhythm: Normal rate and regular rhythm. Heart sounds: Normal heart sounds. No murmur heard. No friction rub. No gallop. Pulmonary:      Effort: Pulmonary effort is normal. No respiratory distress. Breath sounds: Normal breath sounds. No stridor. No wheezing, rhonchi or rales. Chest:      Chest wall: No tenderness. Abdominal:      General: Abdomen is flat. Bowel sounds are normal. There is no distension. Palpations: Abdomen is soft. There is no mass. Tenderness: There is no abdominal tenderness. There is no right CVA tenderness, left CVA tenderness, guarding or rebound. Hernia: No hernia is present. Musculoskeletal:         General: Normal range of motion. Skin:     General: Skin is warm and dry. Capillary Refill: Capillary refill takes less than 2 seconds. Neurological:      Mental Status: She is alert.

## 2023-11-09 LAB — BACTERIA UR CULT: ABNORMAL

## 2023-11-17 DIAGNOSIS — K58.2 IRRITABLE BOWEL SYNDROME WITH BOTH CONSTIPATION AND DIARRHEA: ICD-10-CM

## 2023-11-17 RX ORDER — DICYCLOMINE HYDROCHLORIDE 10 MG/1
10 CAPSULE ORAL AS NEEDED
Qty: 90 CAPSULE | Refills: 0 | Status: SHIPPED | OUTPATIENT
Start: 2023-11-17 | End: 2023-11-20

## 2023-11-20 RX ORDER — DICYCLOMINE HYDROCHLORIDE 10 MG/1
10 CAPSULE ORAL 2 TIMES DAILY PRN
Qty: 90 CAPSULE | Refills: 0 | Status: SHIPPED | OUTPATIENT
Start: 2023-11-20 | End: 2023-11-21

## 2023-11-21 ENCOUNTER — OFFICE VISIT (OUTPATIENT)
Dept: GASTROENTEROLOGY | Facility: MEDICAL CENTER | Age: 28
End: 2023-11-21
Payer: COMMERCIAL

## 2023-11-21 VITALS
WEIGHT: 141.6 LBS | TEMPERATURE: 96.5 F | HEIGHT: 69 IN | DIASTOLIC BLOOD PRESSURE: 78 MMHG | OXYGEN SATURATION: 98 % | HEART RATE: 77 BPM | SYSTOLIC BLOOD PRESSURE: 128 MMHG | BODY MASS INDEX: 20.97 KG/M2

## 2023-11-21 DIAGNOSIS — K52.9 POSTPRANDIAL DIARRHEA: ICD-10-CM

## 2023-11-21 DIAGNOSIS — R19.7 DIARRHEA, UNSPECIFIED TYPE: ICD-10-CM

## 2023-11-21 DIAGNOSIS — K58.0 IRRITABLE BOWEL SYNDROME WITH DIARRHEA: Primary | ICD-10-CM

## 2023-11-21 DIAGNOSIS — R79.0 ABNORMAL SERUM IRON LEVEL: ICD-10-CM

## 2023-11-21 PROCEDURE — 99244 OFF/OP CNSLTJ NEW/EST MOD 40: CPT | Performed by: STUDENT IN AN ORGANIZED HEALTH CARE EDUCATION/TRAINING PROGRAM

## 2023-11-21 RX ORDER — CHOLESTYRAMINE 4 G/9G
4 POWDER, FOR SUSPENSION ORAL 2 TIMES DAILY WITH MEALS
Qty: 60 PACKET | Refills: 0 | Status: SHIPPED | OUTPATIENT
Start: 2023-11-21

## 2023-11-21 RX ORDER — DICYCLOMINE HYDROCHLORIDE 10 MG/1
10 CAPSULE ORAL 4 TIMES DAILY PRN
Qty: 120 CAPSULE | Refills: 5 | Status: SHIPPED | OUTPATIENT
Start: 2023-11-21

## 2023-11-21 NOTE — PROGRESS NOTES
[facility] - Outpatient Consultation  Luz Santana 33 y.o. female MRN: [id number]  Encounter: [id number]          ASSESSMENT AND PLAN:    61-year-old female with longstanding GI issues here for 2 months of acutely worsened postprandial diarrhea and discomfort. I am highly suspicious her symptoms are IBS-D but will screen for celiac with serology, thyroid abnormalities, and IBD with fecal calprotectin. Given the postprandial nature of her bowel movements, will try bile acid binder. 1. Irritable bowel syndrome with diarrhea  2. Diarrhea, unspecified type  3. Postprandial diarrhea  - Ambulatory Referral to Gastroenterology  - Tissue transglutaminase, IgA; Future  - IgA; Future  - TSH, 3rd generation; Future  - Calprotectin,Fecal; Future  - dicyclomine (BENTYL) 10 mg capsule; Take 1 capsule (10 mg total) by mouth 4 (four) times a day as needed (abdominal pain or diarrhea)  Dispense: 120 capsule; Refill: 5  - cholestyramine (QUESTRAN) 4 GM/DOSE powder; Take 1 packet (4 g total) by mouth 2 (two) times a day with meals  Dispense: 60 packet; Refill: 0  - CBC and Platelet; Future    4. Abnormal serum iron level  Reviewed 2/8/2022 iron panel with iron saturation 51% and serum iron 171. Denies any family history of hemochromatosis. Is scheduled to get repeat iron studies ordered by PCP. Will add CBC to these labs. - CBC and Platelet; Future      ______________________________________________________________________    HPI:    Has always ahd a sensitive stomach but feels have gotten a last worse, particularly in the last 2 months. Has to have BM immediately after eating. Afraid to eat is social situations. Can drop 10-12# when symptoms flare. Symptoms worse with heavy cream/milk, garlic. Tried bentyl which helps with pain and fecal urgency but symptoms come back. No nocturnal Bms. At worst, can have 4-5 Bm in a day. On good day, has 1 BM. Stool is usually watery or soft.   No blood in stool. Gets lower abdominal soreness after a BM. Gets some nausea but not vomiting. No heartburn or dysphagia. No abdominal surgeries. No EGD or colonoscopy. No family history of IBD or GI malignancy. REVIEW OF SYSTEMS:    CONSTITUTIONAL: Denies any fever, chills, rigors, and weight loss. HEENT: No earache or tinnitus. Denies hearing loss or visual disturbances. CARDIOVASCULAR: No chest pain or palpitations. RESPIRATORY: Denies any cough, hemoptysis, shortness of breath or dyspnea on exertion. GASTROINTESTINAL: As noted in the History of Present Illness. GENITOURINARY: No problems with urination. Denies any hematuria or dysuria. NEUROLOGIC: No dizziness or vertigo, denies headaches. MUSCULOSKELETAL: Denies any muscle or joint pain. SKIN: Denies skin rashes or itching. ENDOCRINE: Denies excessive thirst. Denies intolerance to heat or cold. PSYCHOSOCIAL: Denies depression or anxiety. Denies any recent memory loss. Historical Information   History reviewed. No pertinent past medical history. History reviewed. No pertinent surgical history. Social History   Social History     Substance and Sexual Activity   Alcohol Use Not Currently     Social History     Substance and Sexual Activity   Drug Use Never     Social History     Tobacco Use   Smoking Status Never   Smokeless Tobacco Never     Family History   Problem Relation Age of Onset    Hypertension Mother     Hyperlipidemia Mother     Hypertension Father     Heart disease Father 58    Hyperlipidemia Father     Heart disease Paternal Grandfather     Cancer Neg Hx        Meds/Allergies       Current Outpatient Medications:     dicyclomine (BENTYL) 10 mg capsule    No Known Allergies        Objective     Blood pressure 128/78, pulse 77, temperature (!) 96.5 °F (35.8 °C), height 5' 9" (1.753 m), weight 64.2 kg (141 lb 9.6 oz), SpO2 98 %. Body mass index is 20.91 kg/m².         PHYSICAL EXAM:      General Appearance:   Alert, cooperative, no distress   HEENT:   Normocephalic, atraumatic, anicteric. Neck:  Supple, symmetrical, trachea midline   Lungs:   Clear to auscultation bilaterally; no rales, rhonchi or wheezing; respirations unlabored    Heart[de-identified]   Regular rate and rhythm; no murmur, rub, or gallop. Abdomen:   Soft, non-tender, non-distended; normal bowel sounds; no masses, no organomegaly    Genitalia:   Deferred    Rectal:   Deferred    Extremities:  No cyanosis, clubbing or edema    Pulses:  2+ and symmetric    Skin:  No jaundice, rashes, or lesions    Lymph nodes:  No palpable cervical lymphadenopathy        Lab Results:   No visits with results within 1 Day(s) from this visit. Latest known visit with results is:   Office Visit on 11/07/2023   Component Date Value    Urine Culture 11/07/2023 >100,000 cfu/ml Citrobacter koseri (A)     LEUKOCYTE ESTERASE,UA 11/07/2023 Moderate     NITRITE,UA 11/07/2023 Negative     SL AMB POCT UROBILINOGEN 11/07/2023 0.2     POCT URINE PROTEIN 11/07/2023 Negative      PH,UA 11/07/2023 6     BLOOD,UA 11/07/2023 Moderate     SPECIFIC GRAVITY,UA 11/07/2023 1.020     KETONES,UA 11/07/2023 Negative     BILIRUBIN,UA 11/07/2023 Moderate     GLUCOSE, UA 11/07/2023 Negative      COLOR,UA 11/07/2023 Yellow     CLARITY,UA 11/07/2023 Cloudy          Radiology Results:   No results found.

## 2023-12-11 DIAGNOSIS — K58.0 IRRITABLE BOWEL SYNDROME WITH DIARRHEA: ICD-10-CM

## 2023-12-11 DIAGNOSIS — R19.7 DIARRHEA, UNSPECIFIED TYPE: ICD-10-CM

## 2023-12-11 RX ORDER — DICYCLOMINE HYDROCHLORIDE 10 MG/1
10 CAPSULE ORAL 4 TIMES DAILY PRN
Qty: 120 CAPSULE | Refills: 0 | Status: SHIPPED | OUTPATIENT
Start: 2023-12-11

## 2023-12-18 ENCOUNTER — APPOINTMENT (OUTPATIENT)
Dept: LAB | Facility: CLINIC | Age: 28
End: 2023-12-18
Payer: COMMERCIAL

## 2023-12-18 DIAGNOSIS — R19.7 DIARRHEA, UNSPECIFIED TYPE: ICD-10-CM

## 2023-12-18 DIAGNOSIS — K58.2 IRRITABLE BOWEL SYNDROME WITH BOTH CONSTIPATION AND DIARRHEA: ICD-10-CM

## 2023-12-18 DIAGNOSIS — R79.0 SERUM IRON RAISED: ICD-10-CM

## 2023-12-18 DIAGNOSIS — K58.0 IRRITABLE BOWEL SYNDROME WITH DIARRHEA: ICD-10-CM

## 2023-12-18 DIAGNOSIS — R79.0 ABNORMAL SERUM IRON LEVEL: ICD-10-CM

## 2023-12-18 DIAGNOSIS — R17 ELEVATED BILIRUBIN: ICD-10-CM

## 2023-12-18 LAB
ALBUMIN SERPL BCP-MCNC: 4.7 G/DL (ref 3.5–5)
ALP SERPL-CCNC: 59 U/L (ref 34–104)
ALT SERPL W P-5'-P-CCNC: 14 U/L (ref 7–52)
ANION GAP SERPL CALCULATED.3IONS-SCNC: 8 MMOL/L
AST SERPL W P-5'-P-CCNC: 17 U/L (ref 13–39)
BILIRUB SERPL-MCNC: 0.52 MG/DL (ref 0.2–1)
BUN SERPL-MCNC: 15 MG/DL (ref 5–25)
CALCIUM SERPL-MCNC: 10.7 MG/DL (ref 8.4–10.2)
CHLORIDE SERPL-SCNC: 105 MMOL/L (ref 96–108)
CO2 SERPL-SCNC: 29 MMOL/L (ref 21–32)
CREAT SERPL-MCNC: 0.82 MG/DL (ref 0.6–1.3)
ERYTHROCYTE [DISTWIDTH] IN BLOOD BY AUTOMATED COUNT: 12.1 % (ref 11.6–15.1)
FERRITIN SERPL-MCNC: 71 NG/ML (ref 11–307)
GFR SERPL CREATININE-BSD FRML MDRD: 98 ML/MIN/1.73SQ M
GLUCOSE SERPL-MCNC: 74 MG/DL (ref 65–140)
HCT VFR BLD AUTO: 44.6 % (ref 34.8–46.1)
HGB BLD-MCNC: 14.4 G/DL (ref 11.5–15.4)
IGA SERPL-MCNC: 220 MG/DL (ref 66–433)
IRON SATN MFR SERPL: 22 % (ref 15–50)
IRON SERPL-MCNC: 81 UG/DL (ref 50–212)
MCH RBC QN AUTO: 29.9 PG (ref 26.8–34.3)
MCHC RBC AUTO-ENTMCNC: 32.3 G/DL (ref 31.4–37.4)
MCV RBC AUTO: 93 FL (ref 82–98)
PLATELET # BLD AUTO: 239 THOUSANDS/UL (ref 149–390)
PMV BLD AUTO: 11.5 FL (ref 8.9–12.7)
POTASSIUM SERPL-SCNC: 3.7 MMOL/L (ref 3.5–5.3)
PROT SERPL-MCNC: 7.1 G/DL (ref 6.4–8.4)
RBC # BLD AUTO: 4.81 MILLION/UL (ref 3.81–5.12)
SODIUM SERPL-SCNC: 142 MMOL/L (ref 135–147)
TIBC SERPL-MCNC: 373 UG/DL (ref 250–450)
TSH SERPL DL<=0.05 MIU/L-ACNC: 2.03 UIU/ML (ref 0.45–4.5)
UIBC SERPL-MCNC: 292 UG/DL (ref 155–355)
WBC # BLD AUTO: 6.59 THOUSAND/UL (ref 4.31–10.16)

## 2023-12-18 PROCEDURE — 86364 TISS TRNSGLTMNASE EA IG CLAS: CPT

## 2023-12-18 PROCEDURE — 36415 COLL VENOUS BLD VENIPUNCTURE: CPT

## 2023-12-18 PROCEDURE — 85027 COMPLETE CBC AUTOMATED: CPT

## 2023-12-18 PROCEDURE — 84443 ASSAY THYROID STIM HORMONE: CPT

## 2023-12-18 PROCEDURE — 82784 ASSAY IGA/IGD/IGG/IGM EACH: CPT

## 2023-12-18 PROCEDURE — 83550 IRON BINDING TEST: CPT

## 2023-12-18 PROCEDURE — 83540 ASSAY OF IRON: CPT

## 2023-12-18 PROCEDURE — 80053 COMPREHEN METABOLIC PANEL: CPT

## 2023-12-18 PROCEDURE — 82728 ASSAY OF FERRITIN: CPT

## 2023-12-19 LAB — TTG IGA SER-ACNC: <2 U/ML (ref 0–3)

## 2023-12-28 ENCOUNTER — TELEPHONE (OUTPATIENT)
Dept: INTERNAL MEDICINE CLINIC | Facility: CLINIC | Age: 28
End: 2023-12-28

## 2023-12-28 ENCOUNTER — OFFICE VISIT (OUTPATIENT)
Dept: URGENT CARE | Facility: CLINIC | Age: 28
End: 2023-12-28
Payer: COMMERCIAL

## 2023-12-28 VITALS
SYSTOLIC BLOOD PRESSURE: 133 MMHG | TEMPERATURE: 97.7 F | OXYGEN SATURATION: 98 % | RESPIRATION RATE: 16 BRPM | HEART RATE: 66 BPM | DIASTOLIC BLOOD PRESSURE: 83 MMHG

## 2023-12-28 DIAGNOSIS — E83.52 SERUM CALCIUM ELEVATED: Primary | ICD-10-CM

## 2023-12-28 DIAGNOSIS — R30.0 DYSURIA: ICD-10-CM

## 2023-12-28 DIAGNOSIS — N30.01 ACUTE CYSTITIS WITH HEMATURIA: Primary | ICD-10-CM

## 2023-12-28 LAB
SL AMB  POCT GLUCOSE, UA: NEGATIVE
SL AMB LEUKOCYTE ESTERASE,UA: ABNORMAL
SL AMB POCT BILIRUBIN,UA: NEGATIVE
SL AMB POCT BLOOD,UA: ABNORMAL
SL AMB POCT CLARITY,UA: ABNORMAL
SL AMB POCT COLOR,UA: ABNORMAL
SL AMB POCT KETONES,UA: NEGATIVE
SL AMB POCT NITRITE,UA: NEGATIVE
SL AMB POCT PH,UA: 7
SL AMB POCT SPECIFIC GRAVITY,UA: 1.01
SL AMB POCT URINE HCG: NEGATIVE
SL AMB POCT URINE PROTEIN: NEGATIVE
SL AMB POCT UROBILINOGEN: 0.2

## 2023-12-28 PROCEDURE — 99213 OFFICE O/P EST LOW 20 MIN: CPT

## 2023-12-28 PROCEDURE — 87186 SC STD MICRODIL/AGAR DIL: CPT

## 2023-12-28 PROCEDURE — 87086 URINE CULTURE/COLONY COUNT: CPT

## 2023-12-28 PROCEDURE — 81002 URINALYSIS NONAUTO W/O SCOPE: CPT

## 2023-12-28 PROCEDURE — 81025 URINE PREGNANCY TEST: CPT

## 2023-12-28 PROCEDURE — 87077 CULTURE AEROBIC IDENTIFY: CPT

## 2023-12-28 RX ORDER — SULFAMETHOXAZOLE AND TRIMETHOPRIM 800; 160 MG/1; MG/1
1 TABLET ORAL EVERY 12 HOURS SCHEDULED
Qty: 10 TABLET | Refills: 0 | Status: SHIPPED | OUTPATIENT
Start: 2023-12-28 | End: 2024-01-02

## 2023-12-28 NOTE — PROGRESS NOTES
[facility]    NAME: Whitney Gomes is a 28 y.o. female  : 1995    MRN: [id number]  DATE: 2023  TIME: 5:12 PM    Assessment and Plan   Acute cystitis with hematuria [N30.01]  1. Acute cystitis with hematuria  sulfamethoxazole-trimethoprim (BACTRIM DS) 800-160 mg per tablet      2. Dysuria  Urine culture    POCT urine dip    POCT urine HCG        Patient Instructions     Urine Dip completed today showing positive for UTI. Will be sent for culture. Antibiotics given today.   Follow-up with PCP in the next 1-2 days for re-evaluation if symptoms continue or worsen  Go to the ED if any fevers, malaise, flank pain, new or worsening symptoms or other concerning symptoms.     Chief Complaint     Chief Complaint   Patient presents with    Possible UTI     Pt reports burning and urgency since yesterday.      History of Present Illness     Whitney Gomes is a 28 y.o. female presenting to the office today for dysuria. Symptoms have been present for 1 days, and include burning, urgency. She has tried nothing for her symptoms, no relief.    Review of Systems     Review of Systems   Constitutional:  Negative for chills and fever.   HENT: Negative.     Respiratory: Negative.  Negative for cough, shortness of breath, wheezing and stridor.    Cardiovascular: Negative.  Negative for chest pain and palpitations.   Gastrointestinal: Negative.  Negative for abdominal pain, nausea and vomiting.   Genitourinary:  Positive for dysuria, frequency, hematuria and urgency. Negative for difficulty urinating, flank pain and pelvic pain.   Musculoskeletal:  Negative for myalgias.   Skin:  Negative for color change and rash.   Neurological:  Negative for seizures and syncope.     Current Medications       Current Outpatient Medications:     sulfamethoxazole-trimethoprim (BACTRIM DS) 800-160 mg per tablet, Take 1 tablet by mouth every 12 (twelve) hours for 5 days, Disp: 10 tablet, Rfl: 0    cholestyramine  (QUESTRAN) 4 GM/DOSE powder, Take 1 packet (4 g total) by mouth 2 (two) times a day with meals, Disp: 60 packet, Rfl: 0    dicyclomine (BENTYL) 10 mg capsule, Take 1 capsule (10 mg total) by mouth 4 (four) times a day as needed (abdominal pain or diarrhea), Disp: 120 capsule, Rfl: 0    Current Allergies     Allergies as of 12/28/2023    (No Known Allergies)            The following portions of the patient's history were reviewed and updated as appropriate: allergies, current medications, past family history, past medical history, past social history, past surgical history and problem list.     History reviewed. No pertinent past medical history.    History reviewed. No pertinent surgical history.    Family History   Problem Relation Age of Onset    Hypertension Mother     Hyperlipidemia Mother     Hypertension Father     Heart disease Father 62    Hyperlipidemia Father     Heart disease Paternal Grandfather     Cancer Neg Hx        Medications have been verified.    Objective     /83   Pulse 66   Temp 97.7 °F (36.5 °C)   Resp 16   SpO2 98%   No LMP recorded.     Physical Exam     Physical Exam  Constitutional:       General: She is not in acute distress.     Appearance: Normal appearance. She is normal weight. She is not ill-appearing, toxic-appearing or diaphoretic.   HENT:      Head: Normocephalic and atraumatic.      Right Ear: External ear normal.      Left Ear: External ear normal.      Nose: Nose normal.      Mouth/Throat:      Mouth: Mucous membranes are moist.   Eyes:      Conjunctiva/sclera: Conjunctivae normal.   Cardiovascular:      Rate and Rhythm: Normal rate and regular rhythm.      Pulses: Normal pulses.      Heart sounds: Normal heart sounds. No murmur heard.     No friction rub. No gallop.   Pulmonary:      Effort: Pulmonary effort is normal. No respiratory distress.      Breath sounds: Normal breath sounds. No stridor. No wheezing, rhonchi or rales.   Chest:      Chest wall: No  tenderness.   Abdominal:      General: Abdomen is flat. There is no distension.      Palpations: Abdomen is soft. There is no mass.      Tenderness: There is no abdominal tenderness. There is no right CVA tenderness, left CVA tenderness or guarding.   Musculoskeletal:         General: Normal range of motion.      Cervical back: Normal range of motion and neck supple.   Skin:     General: Skin is warm and dry.      Capillary Refill: Capillary refill takes less than 2 seconds.      Coloration: Skin is not jaundiced.      Findings: No bruising or rash.   Neurological:      General: No focal deficit present.      Mental Status: She is alert. Mental status is at baseline.   Psychiatric:         Mood and Affect: Mood normal.         Behavior: Behavior normal.

## 2023-12-28 NOTE — PATIENT INSTRUCTIONS
Urine Dip completed today showing positive for UTI. Will be sent for culture. Antibiotics given today.   Follow-up with PCP in the next 1-2 days for re-evaluation if symptoms continue or worsen  Go to the ED if any fevers, malaise, flank pain, new or worsening symptoms or other concerning symptoms.      Urinary Tract Infection in Women   AMBULATORY CARE:   A urinary tract infection (UTI)  is caused by bacteria that get inside your urinary tract. Your urinary tract includes your kidneys, ureters, bladder, and urethra. A UTI is more common in your lower urinary tract, which includes your bladder and urethra.       Common symptoms include the following:   Urinating more often or waking from sleep to urinate    Pain or burning when you urinate    Pain or pressure in your lower abdomen and back    Urine that smells bad    Blood in your urine    Leaking urine    Seek care immediately if:   You are urinating very little or not at all.    You have a high fever with shaking chills.    You have side or back pain that gets worse.    Call your doctor if:   You have a fever.    You do not feel better after 2 days of taking antibiotics.    You have new symptoms, such as blood or pus in your urine.    You are vomiting.    You have questions or concerns about your condition or care.    Treatment for a UTI  may include antibiotics to treat a bacterial infection. You may also need medicines to decrease pain and burning, or decrease the urge to urinate often. If you have UTIs often (called recurrent UTIs), you may be given antibiotics to take regularly. You will be given directions for when and how to use antibiotics. The goal is to prevent UTIs but not cause antibiotic resistance by using antibiotics too often.  Prevent a UTI:   Empty your bladder often.  Urinate and empty your bladder as soon as you feel the need. Do not hold your urine for long periods of time.    Wipe from front to back after you urinate or have a bowel movement.   This will help prevent germs from getting into your urinary tract through your urethra.    Drink liquids as directed.  Ask how much liquid to drink each day and which liquids are best for you. You may need to drink more liquids than usual to help flush out the bacteria. Do not drink alcohol, caffeine, or citrus juices. These can irritate your bladder and increase your symptoms. Your healthcare provider may recommend cranberry juice to help prevent a UTI.    Urinate before and after you have sex.  This can help flush out bacteria passed during sex.    Do not douche or use feminine deodorants.  These can change the chemical balance in your vagina.    Change sanitary pads or tampons often.  This will help prevent germs from getting into your urinary tract.    Talk to your healthcare provider about your birth control method.  You may need to change your method if it is increasing your risk for UTIs.    Wear cotton underwear and clothes that are loose.  Tight pants and nylon underwear can trap moisture and cause bacteria to grow.    Vaginal estrogen may be recommended.  This medicine helps prevent UTIs in women who have gone through menopause or are in analia-menopause.    Do pelvic muscle exercises often.  Pelvic muscle exercises may help you start and stop urinating. Strong pelvic muscles may help you empty your bladder easier. Squeeze these muscles tightly for 5 seconds like you are trying to hold back urine. Then relax for 5 seconds. Gradually work up to squeezing for 10 seconds. Do 3 sets of 15 repetitions a day, or as directed.    Follow up with your doctor as directed:  Write down your questions so you remember to ask them during your visits.  © Copyright [employer] 2023 Information is for End User's use only and may not be sold, redistributed or otherwise used for commercial purposes.  The above information is an  only. It is not intended as medical advice for individual conditions or treatments. Talk  to your doctor, nurse or pharmacist before following any medical regimen to see if it is safe and effective for you.

## 2023-12-28 NOTE — TELEPHONE ENCOUNTER
----- Message from Jay Avila DO sent at 12/28/2023  7:47 AM EST -----  BW I ordered is back.  Iron level is better and other BW is normal except for an increase in calcium level.  results released to [employer].  Recommend to repeat calcium blood test with some add'l blood work before her appt with me next week.  Orders are in, no fasting needed   Otezla Counseling: The side effects of Otezla were discussed with the patient, including but not limited to worsening or new depression, weight loss, diarrhea, nausea, upper respiratory tract infection, and headache. Patient instructed to call the office should any adverse effect occur.  The patient verbalized understanding of the proper use and possible adverse effects of Otezla.  All the patient's questions and concerns were addressed.

## 2023-12-30 LAB
BACTERIA UR CULT: ABNORMAL

## 2024-01-02 ENCOUNTER — OFFICE VISIT (OUTPATIENT)
Dept: INTERNAL MEDICINE CLINIC | Facility: CLINIC | Age: 29
End: 2024-01-02
Payer: COMMERCIAL

## 2024-01-02 VITALS
SYSTOLIC BLOOD PRESSURE: 118 MMHG | DIASTOLIC BLOOD PRESSURE: 76 MMHG | WEIGHT: 142 LBS | OXYGEN SATURATION: 98 % | TEMPERATURE: 98 F | HEART RATE: 84 BPM | BODY MASS INDEX: 20.97 KG/M2

## 2024-01-02 DIAGNOSIS — E83.52 SERUM CALCIUM ELEVATED: Primary | ICD-10-CM

## 2024-01-02 DIAGNOSIS — R19.4 CHANGE IN BOWEL HABITS: ICD-10-CM

## 2024-01-02 PROCEDURE — 99213 OFFICE O/P EST LOW 20 MIN: CPT | Performed by: INTERNAL MEDICINE

## 2024-01-02 NOTE — PROGRESS NOTES
Name: Whitney Gomes      : 1995      MRN: [id number]  Encounter Provider: Jay Avila DO  Encounter Date: 2024   Encounter department: [facility] INTERNAL MEDICINE    Assessment & Plan     1. Serum calcium elevated  Comments:  BW ordered to follow this up, patient aware to complete    2. Change in bowel habits  Comments:  doing better with bentyl prn.  f/u with GI           Subjective      HPI    Here for follow up, Whitney is taking bentyl and it has helped.  She is seeing GI for further care.  She had BW which revealed normal iron levels and an elevated calcium level.  She has not yet gone back for f/w BW on calcium level.  She does not take supplements or prescription medication besides bentyl and antibiotic currently for UTI.  ROS Otherwise negative, no other complaints.    Review of Systems   Constitutional:  Negative for fever.   Gastrointestinal:  Positive for abdominal pain (but improved) and diarrhea (but improved).   Genitourinary:  Negative for dysuria (feeling better from UTI today) and frequency (feeling better from UTI today).   Allergic/Immunologic: Negative for immunocompromised state.       Current Outpatient Medications on File Prior to Visit   Medication Sig    cholestyramine (QUESTRAN) 4 GM/DOSE powder Take 1 packet (4 g total) by mouth 2 (two) times a day with meals    dicyclomine (BENTYL) 10 mg capsule Take 1 capsule (10 mg total) by mouth 4 (four) times a day as needed (abdominal pain or diarrhea)    sulfamethoxazole-trimethoprim (BACTRIM DS) 800-160 mg per tablet Take 1 tablet by mouth every 12 (twelve) hours for 5 days       Objective     /76 (BP Location: Left arm, Patient Position: Sitting, Cuff Size: Standard)   Pulse 84   Temp 98 °F (36.7 °C)   Wt 64.4 kg (142 lb)   SpO2 98%   BMI 20.97 kg/m²     Physical Exam  Vitals reviewed.   Constitutional:       General: She is not in acute distress.  HENT:      Head: Normocephalic and atraumatic.       Right Ear: Tympanic membrane normal.      Left Ear: Tympanic membrane normal.   Eyes:      Conjunctiva/sclera: Conjunctivae normal.   Cardiovascular:      Rate and Rhythm: Normal rate and regular rhythm.      Heart sounds: No murmur heard.  Pulmonary:      Effort: Pulmonary effort is normal.      Breath sounds: No wheezing or rales.   Abdominal:      General: Bowel sounds are normal.      Palpations: Abdomen is soft.      Tenderness: There is no abdominal tenderness.   Musculoskeletal:      Right lower leg: No edema.      Left lower leg: No edema.   Neurological:      Mental Status: She is alert.   Psychiatric:         Mood and Affect: Mood normal.         Behavior: Behavior normal.       Jay Avila DO

## 2024-01-03 ENCOUNTER — OFFICE VISIT (OUTPATIENT)
Dept: URGENT CARE | Facility: CLINIC | Age: 29
End: 2024-01-03
Payer: COMMERCIAL

## 2024-01-03 VITALS
SYSTOLIC BLOOD PRESSURE: 118 MMHG | TEMPERATURE: 96.9 F | WEIGHT: 142 LBS | HEIGHT: 70 IN | BODY MASS INDEX: 20.33 KG/M2 | DIASTOLIC BLOOD PRESSURE: 73 MMHG | HEART RATE: 77 BPM | OXYGEN SATURATION: 98 % | RESPIRATION RATE: 14 BRPM

## 2024-01-03 DIAGNOSIS — N30.00 ACUTE CYSTITIS WITHOUT HEMATURIA: Primary | ICD-10-CM

## 2024-01-03 DIAGNOSIS — R39.9 UTI SYMPTOMS: ICD-10-CM

## 2024-01-03 LAB
SL AMB  POCT GLUCOSE, UA: NEGATIVE
SL AMB LEUKOCYTE ESTERASE,UA: NEGATIVE
SL AMB POCT BILIRUBIN,UA: NEGATIVE
SL AMB POCT BLOOD,UA: NEGATIVE
SL AMB POCT CLARITY,UA: NORMAL
SL AMB POCT COLOR,UA: NORMAL
SL AMB POCT KETONES,UA: NEGATIVE
SL AMB POCT NITRITE,UA: NEGATIVE
SL AMB POCT PH,UA: 7
SL AMB POCT SPECIFIC GRAVITY,UA: 1.01
SL AMB POCT URINE PROTEIN: NEGATIVE
SL AMB POCT UROBILINOGEN: 0.2

## 2024-01-03 PROCEDURE — 87086 URINE CULTURE/COLONY COUNT: CPT

## 2024-01-03 PROCEDURE — 99213 OFFICE O/P EST LOW 20 MIN: CPT

## 2024-01-03 PROCEDURE — 81002 URINALYSIS NONAUTO W/O SCOPE: CPT

## 2024-01-03 RX ORDER — CIPROFLOXACIN 500 MG/1
500 TABLET, FILM COATED ORAL EVERY 24 HOURS
Qty: 3 TABLET | Refills: 0 | Status: SHIPPED | OUTPATIENT
Start: 2024-01-03 | End: 2024-01-06

## 2024-01-03 NOTE — PROGRESS NOTES
[facility]        NAME: Whitney Gomes is a 28 y.o. female  : 1995    MRN: [id number]  DATE: January 3, 2024  TIME: 8:49 AM    Assessment and Plan   Acute cystitis without hematuria [N30.00]  1. Acute cystitis without hematuria  ciprofloxacin (CIPRO) 500 mg tablet      Prescribed ciprofloxacin due to non resolution of symptoms with Bactrim treatment.   Patient Instructions   Urine Dip completed today. Will be sent for culture. Antibiotics given today.   Follow-up with PCP in the next 1-2 days for re-evaluation if symptoms continue or worsen  Go to the ED if any fevers, malaise, flank pain, new or worsening symptoms or other concerning symptoms.   Chief Complaint     Chief Complaint   Patient presents with    Possible UTI     Pt states that it started last Wednesday. Pt states that she was seen and was given medication and finished her antibiotics Monday morning.      History of Present Illness     Whitney Gomes is a 28 y.o. female presenting to the office today for dysuria. Symptoms have been present for 7 days, and include burning with urination, frequency, urgency. She has tried Bactrim for her symptoms, some relief. She states initially after treatment with Bactrim her symptoms improved, but now she is having burning and discomfort at night that has been increasing. She states the blood in her urine improved as well. Patient denies any other symptoms at this time.     Review of Systems     Review of Systems   Constitutional:  Negative for chills and fever.   HENT: Negative.     Respiratory: Negative.  Negative for cough, shortness of breath and wheezing.    Cardiovascular:  Negative for chest pain and palpitations.   Gastrointestinal:  Negative for abdominal pain, nausea and vomiting.   Genitourinary:  Positive for difficulty urinating, dysuria, frequency and urgency. Negative for decreased urine volume, flank pain, hematuria, vaginal bleeding, vaginal discharge and vaginal pain.  "  Musculoskeletal:  Negative for myalgias.   Skin:  Negative for color change and rash.   Neurological:  Negative for seizures and syncope.     Current Medications       Current Outpatient Medications:     cholestyramine (QUESTRAN) 4 GM/DOSE powder, Take 1 packet (4 g total) by mouth 2 (two) times a day with meals, Disp: 60 packet, Rfl: 0    ciprofloxacin (CIPRO) 500 mg tablet, Take 1 tablet (500 mg total) by mouth every 24 hours for 3 days, Disp: 3 tablet, Rfl: 0    dicyclomine (BENTYL) 10 mg capsule, Take 1 capsule (10 mg total) by mouth 4 (four) times a day as needed (abdominal pain or diarrhea), Disp: 120 capsule, Rfl: 0    Current Allergies     Allergies as of 01/03/2024    (No Known Allergies)            The following portions of the patient's history were reviewed and updated as appropriate: allergies, current medications, past family history, past medical history, past social history, past surgical history and problem list.     History reviewed. No pertinent past medical history.    History reviewed. No pertinent surgical history.    Family History   Problem Relation Age of Onset    Hypertension Mother     Hyperlipidemia Mother     Hypertension Father     Heart disease Father 62    Hyperlipidemia Father     Heart disease Paternal Grandfather     Cancer Neg Hx        Medications have been verified.    Objective     /73   Pulse 77   Temp (!) 96.9 °F (36.1 °C) (Tympanic)   Resp 14   Ht 5' 10\" (1.778 m)   Wt 64.4 kg (142 lb)   SpO2 98%   BMI 20.37 kg/m²   No LMP recorded.     Physical Exam     Physical Exam  Vitals and nursing note reviewed.   Constitutional:       General: She is not in acute distress.     Appearance: Normal appearance. She is normal weight. She is not ill-appearing, toxic-appearing or diaphoretic.   HENT:      Head: Normocephalic and atraumatic.      Right Ear: External ear normal.      Left Ear: External ear normal.      Nose: Nose normal.      Mouth/Throat:      Mouth: Mucous " membranes are moist.   Eyes:      Conjunctiva/sclera: Conjunctivae normal.   Cardiovascular:      Rate and Rhythm: Normal rate and regular rhythm.      Pulses: Normal pulses.      Heart sounds: Normal heart sounds. No murmur heard.     No friction rub. No gallop.   Pulmonary:      Effort: Pulmonary effort is normal. No respiratory distress.      Breath sounds: Normal breath sounds. No stridor. No wheezing, rhonchi or rales.   Chest:      Chest wall: No tenderness.   Abdominal:      General: Abdomen is flat. There is no distension.      Palpations: Abdomen is soft.      Tenderness: There is no abdominal tenderness. There is no right CVA tenderness, left CVA tenderness or guarding.   Musculoskeletal:         General: Normal range of motion.      Cervical back: Normal range of motion and neck supple.   Skin:     General: Skin is warm and dry.      Capillary Refill: Capillary refill takes less than 2 seconds.      Coloration: Skin is not jaundiced.      Findings: No bruising or rash.   Neurological:      General: No focal deficit present.      Mental Status: She is alert. Mental status is at baseline.   Psychiatric:         Mood and Affect: Mood normal.         Behavior: Behavior normal.

## 2024-01-03 NOTE — PATIENT INSTRUCTIONS
Urine Dip completed today. Will be sent for culture. Antibiotics given today.   Follow-up with PCP in the next 1-2 days for re-evaluation if symptoms continue or worsen  Go to the ED if any fevers, malaise, flank pain, new or worsening symptoms or other concerning symptoms.   Urinary Tract Infection in Women   AMBULATORY CARE:   A urinary tract infection (UTI)  is caused by bacteria that get inside your urinary tract. Your urinary tract includes your kidneys, ureters, bladder, and urethra. A UTI is more common in your lower urinary tract, which includes your bladder and urethra.       Common symptoms include the following:   Urinating more often or waking from sleep to urinate    Pain or burning when you urinate    Pain or pressure in your lower abdomen and back    Urine that smells bad    Blood in your urine    Leaking urine    Seek care immediately if:   You are urinating very little or not at all.    You have a high fever with shaking chills.    You have side or back pain that gets worse.    Call your doctor if:   You have a fever.    You do not feel better after 2 days of taking antibiotics.    You have new symptoms, such as blood or pus in your urine.    You are vomiting.    You have questions or concerns about your condition or care.    Treatment for a UTI  may include antibiotics to treat a bacterial infection. You may also need medicines to decrease pain and burning, or decrease the urge to urinate often. If you have UTIs often (called recurrent UTIs), you may be given antibiotics to take regularly. You will be given directions for when and how to use antibiotics. The goal is to prevent UTIs but not cause antibiotic resistance by using antibiotics too often.  Prevent a UTI:   Empty your bladder often.  Urinate and empty your bladder as soon as you feel the need. Do not hold your urine for long periods of time.    Wipe from front to back after you urinate or have a bowel movement.  This will help prevent  germs from getting into your urinary tract through your urethra.    Drink liquids as directed.  Ask how much liquid to drink each day and which liquids are best for you. You may need to drink more liquids than usual to help flush out the bacteria. Do not drink alcohol, caffeine, or citrus juices. These can irritate your bladder and increase your symptoms. Your healthcare provider may recommend cranberry juice to help prevent a UTI.    Urinate before and after you have sex.  This can help flush out bacteria passed during sex.    Do not douche or use feminine deodorants.  These can change the chemical balance in your vagina.    Change sanitary pads or tampons often.  This will help prevent germs from getting into your urinary tract.    Talk to your healthcare provider about your birth control method.  You may need to change your method if it is increasing your risk for UTIs.    Wear cotton underwear and clothes that are loose.  Tight pants and nylon underwear can trap moisture and cause bacteria to grow.    Vaginal estrogen may be recommended.  This medicine helps prevent UTIs in women who have gone through menopause or are in analia-menopause.    Do pelvic muscle exercises often.  Pelvic muscle exercises may help you start and stop urinating. Strong pelvic muscles may help you empty your bladder easier. Squeeze these muscles tightly for 5 seconds like you are trying to hold back urine. Then relax for 5 seconds. Gradually work up to squeezing for 10 seconds. Do 3 sets of 15 repetitions a day, or as directed.    Follow up with your doctor as directed:  Write down your questions so you remember to ask them during your visits.  © Copyright [employer] 2023 Information is for End User's use only and may not be sold, redistributed or otherwise used for commercial purposes.  The above information is an  only. It is not intended as medical advice for individual conditions or treatments. Talk to your doctor, nurse  or pharmacist before following any medical regimen to see if it is safe and effective for you.

## 2024-01-04 ENCOUNTER — APPOINTMENT (OUTPATIENT)
Dept: LAB | Facility: CLINIC | Age: 29
End: 2024-01-04
Payer: COMMERCIAL

## 2024-01-04 LAB — BACTERIA UR CULT: NORMAL

## 2024-02-26 ENCOUNTER — OFFICE VISIT (OUTPATIENT)
Dept: OBGYN CLINIC | Facility: CLINIC | Age: 29
End: 2024-02-26
Payer: COMMERCIAL

## 2024-02-26 VITALS
WEIGHT: 141 LBS | SYSTOLIC BLOOD PRESSURE: 108 MMHG | BODY MASS INDEX: 20.19 KG/M2 | HEIGHT: 70 IN | DIASTOLIC BLOOD PRESSURE: 68 MMHG

## 2024-02-26 DIAGNOSIS — Z01.419 WOMEN'S ANNUAL ROUTINE GYNECOLOGICAL EXAMINATION: Primary | ICD-10-CM

## 2024-02-26 PROCEDURE — S0612 ANNUAL GYNECOLOGICAL EXAMINA: HCPCS | Performed by: OBSTETRICS & GYNECOLOGY

## 2024-02-26 PROCEDURE — G0145 SCR C/V CYTO,THINLAYER,RESCR: HCPCS | Performed by: OBSTETRICS & GYNECOLOGY

## 2024-02-26 NOTE — PROGRESS NOTES
"Assessment/Plan:    The patient was informed of a stable GYN examination.  She declined STD screening.  She declined screening for GC and chlamydia or Pap smear.  She is content with her weight.  She feels safe at home living with her father.  She is happy with her divorce.  She should return to my office in 1 year.  She will let me know if she wants to change her method of contraception.         Subjective:      Patient ID: Whitney Gomes is a 29 y.o. female.    HPI    This is a 29-year-old white female, she is nulliparous.  She is now in a new relationship.  She just finished going to the voice.  She is very content with that and very happy.  She feels safe at home.  She sees a dentist on a regular basis.  She is content with her weight.  Denies any prior depression or anxiety.  There are no new major family illnesses to report.  Her father does have a history of heart disease.  Her mother has a history of alcohol abuse.  She declined STD testing.  She will get a Pap smear today.  She still using condoms for contraception.        The following portions of the patient's history were reviewed and updated as appropriate: allergies, current medications, past family history, past medical history, past social history, past surgical history, and problem list.    Review of Systems      Objective:      /68   Ht 5' 10\" (1.778 m)   Wt 64 kg (141 lb)   LMP 01/26/2024 (Approximate)   BMI 20.23 kg/m²          Physical Exam  Vitals reviewed. Exam conducted with a chaperone present.   Constitutional:       Appearance: Normal appearance.   HENT:      Head: Normocephalic and atraumatic.      Nose: Nose normal.      Mouth/Throat:      Mouth: Mucous membranes are moist.   Eyes:      Pupils: Pupils are equal, round, and reactive to light.   Cardiovascular:      Rate and Rhythm: Normal rate and regular rhythm.      Pulses: Normal pulses.      Heart sounds: Normal heart sounds.   Pulmonary:      Effort: Pulmonary effort is " normal.      Breath sounds: Normal breath sounds.   Chest:   Breasts:     Breasts are symmetrical.      Right: Normal.      Left: Normal.   Abdominal:      General: Abdomen is flat. Bowel sounds are normal.      Palpations: Abdomen is soft. There is no hepatomegaly or splenomegaly.      Hernia: There is no hernia in the left inguinal area or right inguinal area.   Genitourinary:     General: Normal vulva.      Pubic Area: No rash or pubic lice.       Labia:         Right: No rash, tenderness, lesion or injury.         Left: No rash, tenderness, lesion or injury.       Urethra: No prolapse or urethral pain.      Vagina: Normal.      Cervix: Normal.      Uterus: Normal.       Adnexa: Right adnexa normal and left adnexa normal.      Rectum: Normal.      Comments: The external genitalia normal limits, the vagina is clean and cervix is closed.  A Pap smear was performed.  Uterus is anterior normal size.  There is no cervical motion tenderness.  There is no evidence of prolapse.  The adnexa clear.  Urethra and bladder normal working relationship  Musculoskeletal:         General: Normal range of motion.      Cervical back: Normal range of motion and neck supple.   Lymphadenopathy:      Upper Body:      Right upper body: No supraclavicular adenopathy.      Left upper body: No supraclavicular adenopathy.      Lower Body: No right inguinal adenopathy. No left inguinal adenopathy.   Skin:     General: Skin is warm and dry.   Neurological:      General: No focal deficit present.      Mental Status: She is alert and oriented to person, place, and time.   Psychiatric:         Mood and Affect: Mood normal.         Behavior: Behavior normal.

## 2024-02-26 NOTE — PATIENT INSTRUCTIONS
The patient was informed of a stable GYN examination.  She declined STD screening.  Pap smear was performed.  She is happy with the method of contraception using condoms.  She will return my office in 1 year unless things change she will want to do a different method of contraception.

## 2024-03-04 LAB
LAB AP GYN PRIMARY INTERPRETATION: NORMAL
Lab: NORMAL

## 2024-03-07 ENCOUNTER — TELEPHONE (OUTPATIENT)
Dept: GASTROENTEROLOGY | Facility: MEDICAL CENTER | Age: 29
End: 2024-03-07

## 2024-05-15 ENCOUNTER — OFFICE VISIT (OUTPATIENT)
Dept: URGENT CARE | Facility: CLINIC | Age: 29
End: 2024-05-15
Payer: COMMERCIAL

## 2024-05-15 VITALS
RESPIRATION RATE: 16 BRPM | SYSTOLIC BLOOD PRESSURE: 116 MMHG | TEMPERATURE: 97.7 F | DIASTOLIC BLOOD PRESSURE: 68 MMHG | WEIGHT: 140 LBS | BODY MASS INDEX: 20.04 KG/M2 | HEIGHT: 70 IN | OXYGEN SATURATION: 99 % | HEART RATE: 80 BPM

## 2024-05-15 DIAGNOSIS — N30.90 CYSTITIS: Primary | ICD-10-CM

## 2024-05-15 LAB
SL AMB  POCT GLUCOSE, UA: NEGATIVE
SL AMB LEUKOCYTE ESTERASE,UA: ABNORMAL
SL AMB POCT BILIRUBIN,UA: NEGATIVE
SL AMB POCT BLOOD,UA: ABNORMAL
SL AMB POCT CLARITY,UA: ABNORMAL
SL AMB POCT COLOR,UA: ABNORMAL
SL AMB POCT KETONES,UA: NEGATIVE
SL AMB POCT NITRITE,UA: NEGATIVE
SL AMB POCT PH,UA: 5
SL AMB POCT SPECIFIC GRAVITY,UA: 1.01
SL AMB POCT URINE PROTEIN: 100
SL AMB POCT UROBILINOGEN: 0.2

## 2024-05-15 PROCEDURE — 81002 URINALYSIS NONAUTO W/O SCOPE: CPT | Performed by: NURSE PRACTITIONER

## 2024-05-15 PROCEDURE — S9083 URGENT CARE CENTER GLOBAL: HCPCS | Performed by: NURSE PRACTITIONER

## 2024-05-15 PROCEDURE — G0382 LEV 3 HOSP TYPE B ED VISIT: HCPCS | Performed by: NURSE PRACTITIONER

## 2024-05-15 PROCEDURE — 87086 URINE CULTURE/COLONY COUNT: CPT | Performed by: NURSE PRACTITIONER

## 2024-05-15 RX ORDER — NITROFURANTOIN 25; 75 MG/1; MG/1
100 CAPSULE ORAL 2 TIMES DAILY
Qty: 6 CAPSULE | Refills: 0 | Status: SHIPPED | OUTPATIENT
Start: 2024-05-15

## 2024-05-15 NOTE — LETTER
May 15, 2024     Patient: Whitney Gomes   YOB: 1995   Date of Visit: 5/15/2024       To Whom it May Concern:    Whitney Gomes was seen in my clinic on 5/15/2024. She may return to work on 05/16/2024 .    If you have any questions or concerns, please don't hesitate to call.         Sincerely,          RANDAL Cornelius        CC: No Recipients

## 2024-05-15 NOTE — PROGRESS NOTES
[facility]        NAME: Whitney Gomes is a 29 y.o. female  : 1995    MRN: [id number]  DATE: May 15, 2024  TIME: 6:02 PM    Assessment and Plan   Cystitis [N30.90]  1. Cystitis  POCT urine dip    Urine culture    Urine culture    nitrofurantoin (MACROBID) 100 mg capsule            Patient Instructions     Urine is positive for blood and leuks esterace  Will send for culture  In the meantime start antibiotics   Follow up with PCP in 3-5 days.  Proceed to  ER if symptoms worsen.    If tests have been performed at [facility], our office will contact you with results if changes need to be made to the care plan discussed with you at the visit.  You can review your full results on [facility].    Chief Complaint     Chief Complaint   Patient presents with    Possible UTI     Pt presents with urinary urgency and burning x 2 days.  Hx of frequent UTIs         History of Present Illness       HPI  Reports urinary symptoms for 2 days.  Includes pain with urination and urinary urgency.  Also frequency.  Denies fever, nausea, vomiting.  No abdominal pain.  Last UTI was 4 months ago.    Review of Systems   Review of Systems   Constitutional:  Negative for fever.   Gastrointestinal:  Negative for abdominal pain, diarrhea, nausea and vomiting.   Genitourinary:  Positive for dysuria, frequency and urgency.         Current Medications       Current Outpatient Medications:     dicyclomine (BENTYL) 10 mg capsule, Take 1 capsule (10 mg total) by mouth 4 (four) times a day as needed (abdominal pain or diarrhea), Disp: 120 capsule, Rfl: 0    nitrofurantoin (MACROBID) 100 mg capsule, Take 1 capsule (100 mg total) by mouth 2 (two) times a day, Disp: 6 capsule, Rfl: 0    cholestyramine (QUESTRAN) 4 GM/DOSE powder, Take 1 packet (4 g total) by mouth 2 (two) times a day with meals (Patient not taking: Reported on 2024), Disp: 60 packet, Rfl: 0    Current Allergies     Allergies as of 05/15/2024    (No Known  "Allergies)            The following portions of the patient's history were reviewed and updated as appropriate: allergies, current medications, past family history, past medical history, past social history, past surgical history and problem list.     History reviewed. No pertinent past medical history.    History reviewed. No pertinent surgical history.    Family History   Problem Relation Age of Onset    Hypertension Mother     Hyperlipidemia Mother     Hypertension Father     Heart disease Father 62    Hyperlipidemia Father     Heart disease Paternal Grandfather     Cancer Neg Hx          Medications have been verified.        Objective   /68   Pulse 80   Temp 97.7 °F (36.5 °C)   Resp 16   Ht 5' 10\" (1.778 m)   Wt 63.5 kg (140 lb)   SpO2 99%   BMI 20.09 kg/m²   No LMP recorded.       Physical Exam     Physical Exam  Constitutional:       Appearance: She is not ill-appearing or toxic-appearing.   Cardiovascular:      Rate and Rhythm: Regular rhythm.      Heart sounds: Normal heart sounds.   Pulmonary:      Effort: Pulmonary effort is normal.      Breath sounds: Normal breath sounds.   Abdominal:      Tenderness: There is no abdominal tenderness. There is no right CVA tenderness, left CVA tenderness or guarding.                   "

## 2024-05-16 LAB — BACTERIA UR CULT: NORMAL

## 2024-07-31 ENCOUNTER — OFFICE VISIT (OUTPATIENT)
Dept: URGENT CARE | Facility: CLINIC | Age: 29
End: 2024-07-31
Payer: COMMERCIAL

## 2024-07-31 VITALS
WEIGHT: 138.2 LBS | HEART RATE: 104 BPM | HEIGHT: 70 IN | DIASTOLIC BLOOD PRESSURE: 64 MMHG | SYSTOLIC BLOOD PRESSURE: 118 MMHG | TEMPERATURE: 100 F | BODY MASS INDEX: 19.79 KG/M2 | RESPIRATION RATE: 18 BRPM | OXYGEN SATURATION: 99 %

## 2024-07-31 DIAGNOSIS — U07.1 COVID-19: Primary | ICD-10-CM

## 2024-07-31 LAB
SARS-COV-2 AG UPPER RESP QL IA: POSITIVE
VALID CONTROL: ABNORMAL

## 2024-07-31 PROCEDURE — 87811 SARS-COV-2 COVID19 W/OPTIC: CPT | Performed by: PHYSICIAN ASSISTANT

## 2024-07-31 PROCEDURE — S9083 URGENT CARE CENTER GLOBAL: HCPCS | Performed by: PHYSICIAN ASSISTANT

## 2024-07-31 PROCEDURE — G0382 LEV 3 HOSP TYPE B ED VISIT: HCPCS | Performed by: PHYSICIAN ASSISTANT

## 2024-07-31 NOTE — PROGRESS NOTES
[facility]      NAME: Whitney Gomes is a 29 y.o. female  : 1995    MRN: [id number]  DATE: 2024  TIME: 3:12 PM    Assessment and Plan   COVID-19 [U07.1]  1. COVID-19  Poct Covid 19 Rapid Antigen Test          Patient Instructions   + covid. Infection appears viral.  Recommend symptomatic treatment.  Can take ibuprofen or tylenol as needed for pain or fever.  Over the counter cough and cold medications to help with symptoms.  Use salt water gargles for sore throat and throat lozenges.  Cough drops as needed.  Wash hands frequently to prevent the spread of infection.   Risks and benefits discussed. Patient understands and agrees with the plan.      If tests have been performed at [facility], our office will contact you with results if changes need to be made to the care plan discussed with you at the visit.  You can review your full results on [facility].     Follow up with PCP in 3-5 days.      If any of the following occur, please report to your nearest ED for evaluation or call 911.   Difficultly breathing or shortness of breath  Chest pain  Acutely worsening symptoms.   To present to the ER if symptoms worsen.  Chief Complaint     Chief Complaint   Patient presents with    Headache     Started over the weekend with worsening head ache and sinus pressure, denies fevers but reports chills, concerned for recent covid exposure          History of Present Illness   Whitney Gomes presents to the clinic c/o    URI   This is a new problem. The current episode started in the past 7 days. The problem has been unchanged. There has been no fever. Associated symptoms include coughing and headaches. Pertinent negatives include no abdominal pain, congestion, diarrhea, ear pain, nausea, rash, sinus pain, sore throat, vomiting or wheezing. She has tried decongestant and acetaminophen for the symptoms. The treatment provided mild relief.       Review of Systems   Review of Systems    Constitutional:  Positive for chills and fatigue. Negative for diaphoresis and fever.   HENT:  Positive for sinus pressure. Negative for congestion, ear discharge, ear pain, facial swelling, sinus pain and sore throat.    Eyes:  Negative for photophobia, pain, discharge, redness, itching and visual disturbance.   Respiratory:  Positive for cough. Negative for apnea, chest tightness, shortness of breath and wheezing.    Gastrointestinal:  Negative for abdominal pain, diarrhea, nausea and vomiting.   Skin:  Negative for color change, rash and wound.   Neurological:  Positive for headaches.   Hematological:  Negative for adenopathy.         Current Medications     Long-Term Medications   Medication Sig Dispense Refill    cholestyramine (QUESTRAN) 4 GM/DOSE powder Take 1 packet (4 g total) by mouth 2 (two) times a day with meals (Patient not taking: Reported on 2/26/2024) 60 packet 0    dicyclomine (BENTYL) 10 mg capsule Take 1 capsule (10 mg total) by mouth 4 (four) times a day as needed (abdominal pain or diarrhea) 120 capsule 0       Current Allergies     Allergies as of 07/31/2024    (No Known Allergies)            The following portions of the patient's history were reviewed and updated as appropriate: allergies, current medications, past family history, past medical history, past social history, past surgical history and problem list.  History reviewed. No pertinent past medical history.  History reviewed. No pertinent surgical history.  Social History     Socioeconomic History    Marital status: Single     Spouse name: Not on file    Number of children: Not on file    Years of education: Not on file    Highest education level: Not on file   Occupational History    Not on file   Tobacco Use    Smoking status: Never    Smokeless tobacco: Never   Vaping Use    Vaping status: Never Used   Substance and Sexual Activity    Alcohol use: Not Currently    Drug use: Never    Sexual activity: Yes     Partners: Male      "Birth control/protection: Condom Male   Other Topics Concern    Not on file   Social History Narrative    Not on file     Social Determinants of Health     Financial Resource Strain: Not on file   Food Insecurity: Not on file   Transportation Needs: Not on file   Physical Activity: Not on file   Stress: Not on file   Social Connections: Not on file   Intimate Partner Violence: Not on file   Housing Stability: Not on file       Objective   /64 (BP Location: Right arm, Patient Position: Sitting)   Pulse 104   Temp 100 °F (37.8 °C) (Tympanic)   Resp 18   Ht 5' 10\" (1.778 m)   Wt 62.7 kg (138 lb 3.2 oz)   LMP  (Within Weeks)   SpO2 99%   BMI 19.83 kg/m²      Physical Exam     Physical Exam  Vitals and nursing note reviewed.   Constitutional:       General: She is not in acute distress.     Appearance: She is well-developed. She is not diaphoretic.   HENT:      Head: Normocephalic and atraumatic.      Right Ear: Tympanic membrane and external ear normal.      Left Ear: Tympanic membrane and external ear normal.      Nose: Nose normal.      Mouth/Throat:      Mouth: Mucous membranes are moist.      Pharynx: No oropharyngeal exudate or posterior oropharyngeal erythema.   Eyes:      General: No scleral icterus.        Right eye: No discharge.         Left eye: No discharge.      Conjunctiva/sclera: Conjunctivae normal.   Cardiovascular:      Rate and Rhythm: Normal rate and regular rhythm.      Heart sounds: Normal heart sounds. No murmur heard.     No friction rub. No gallop.   Pulmonary:      Effort: Pulmonary effort is normal. No respiratory distress.      Breath sounds: Normal breath sounds. No decreased breath sounds, wheezing, rhonchi or rales.   Skin:     General: Skin is warm and dry.      Coloration: Skin is not pale.      Findings: No erythema or rash.   Neurological:      Mental Status: She is alert and oriented to person, place, and time.   Psychiatric:         Behavior: Behavior normal.         " Thought Content: Thought content normal.         Judgment: Judgment normal.         Judit Ag PA-C

## 2024-07-31 NOTE — LETTER
July 31, 2024     Patient: Whitney Gomes  YOB: 1995  Date of Visit: 7/31/2024      To Whom it May Concern:    Whitney Gomes is under my professional care. Whitney was seen in my office on 7/31/2024. Whitney may return to work on 8/3 .    If you have any questions or concerns, please don't hesitate to call.         Sincerely,          BE ROSANA JENKINS        CC: No Recipients

## 2024-09-18 DIAGNOSIS — R19.7 DIARRHEA, UNSPECIFIED TYPE: ICD-10-CM

## 2024-09-18 DIAGNOSIS — K58.0 IRRITABLE BOWEL SYNDROME WITH DIARRHEA: ICD-10-CM

## 2024-09-19 RX ORDER — DICYCLOMINE HYDROCHLORIDE 10 MG/1
10 CAPSULE ORAL 4 TIMES DAILY PRN
Qty: 120 CAPSULE | Refills: 5 | Status: SHIPPED | OUTPATIENT
Start: 2024-09-19

## 2025-03-09 ENCOUNTER — OFFICE VISIT (OUTPATIENT)
Dept: URGENT CARE | Facility: CLINIC | Age: 30
End: 2025-03-09
Payer: COMMERCIAL

## 2025-03-09 VITALS
DIASTOLIC BLOOD PRESSURE: 76 MMHG | WEIGHT: 135 LBS | RESPIRATION RATE: 18 BRPM | SYSTOLIC BLOOD PRESSURE: 116 MMHG | HEART RATE: 75 BPM | OXYGEN SATURATION: 98 % | BODY MASS INDEX: 18.9 KG/M2 | HEIGHT: 71 IN | TEMPERATURE: 99.4 F

## 2025-03-09 DIAGNOSIS — L08.9 INFECTED CYST OF SKIN: Primary | ICD-10-CM

## 2025-03-09 DIAGNOSIS — L72.9 INFECTED CYST OF SKIN: Primary | ICD-10-CM

## 2025-03-09 PROCEDURE — S9083 URGENT CARE CENTER GLOBAL: HCPCS | Performed by: PHYSICIAN ASSISTANT

## 2025-03-09 PROCEDURE — G0382 LEV 3 HOSP TYPE B ED VISIT: HCPCS | Performed by: PHYSICIAN ASSISTANT

## 2025-03-09 RX ORDER — CEPHALEXIN 500 MG/1
500 CAPSULE ORAL EVERY 8 HOURS SCHEDULED
Qty: 30 CAPSULE | Refills: 0 | Status: SHIPPED | OUTPATIENT
Start: 2025-03-09 | End: 2025-03-19

## 2025-03-09 NOTE — PROGRESS NOTES
"[facility]        NAME: Whitney Gomes is a 30 y.o. female  : 1995    MRN: [id number]  DATE: 2025  TIME: 11:44 AM    /76 (BP Location: Left arm, Patient Position: Sitting, Cuff Size: Standard)   Pulse 75   Temp 99.4 °F (37.4 °C) (Temporal)   Resp 18   Ht 5' 11\" (1.803 m)   Wt 61.2 kg (135 lb)   LMP 2025 (Approximate)   SpO2 98%   BMI 18.83 kg/m²     Assessment and Plan   Infected cyst of skin [L72.9, L08.9]  1. Infected cyst of skin  cephalexin (KEFLEX) 500 mg capsule            Patient Instructions       Follow up with PCP in 3-5 days.  Proceed to  ER if symptoms worsen.    Chief Complaint     Chief Complaint   Patient presents with    Abscess     Patient has a cyst on back of her neck for a while, she states it flared up on Wednesday, denies drainage, denies open area, gotten larger in size, denies other symptoms          History of Present Illness       Pt with lump on back of neck for several years, pt now eith 3 days tenderness and redness     Abscess        Review of Systems   Review of Systems   Constitutional: Negative.    HENT: Negative.     Eyes: Negative.    Respiratory: Negative.     Cardiovascular: Negative.    Gastrointestinal: Negative.    Endocrine: Negative.    Genitourinary: Negative.    Musculoskeletal: Negative.    Skin: Negative.    Allergic/Immunologic: Negative.    Neurological: Negative.    Hematological: Negative.    Psychiatric/Behavioral: Negative.     All other systems reviewed and are negative.        Current Medications       Current Outpatient Medications:     cephalexin (KEFLEX) 500 mg capsule, Take 1 capsule (500 mg total) by mouth every 8 (eight) hours for 10 days, Disp: 30 capsule, Rfl: 0    cholestyramine (QUESTRAN) 4 GM/DOSE powder, Take 1 packet (4 g total) by mouth 2 (two) times a day with meals (Patient not taking: Reported on 2024), Disp: 60 packet, Rfl: 0    dicyclomine (BENTYL) 10 mg capsule, Take 1 capsule (10 mg total) " "by mouth 4 (four) times a day as needed (abdominal pain or diarrhea), Disp: 120 capsule, Rfl: 5    nitrofurantoin (MACROBID) 100 mg capsule, Take 1 capsule (100 mg total) by mouth 2 (two) times a day, Disp: 6 capsule, Rfl: 0    Current Allergies     Allergies as of 03/09/2025    (No Known Allergies)            The following portions of the patient's history were reviewed and updated as appropriate: allergies, current medications, past family history, past medical history, past social history, past surgical history and problem list.     No past medical history on file.    No past surgical history on file.    Family History   Problem Relation Age of Onset    Hypertension Mother     Hyperlipidemia Mother     Hypertension Father     Heart disease Father 62    Hyperlipidemia Father     Heart disease Paternal Grandfather     Cancer Neg Hx          Medications have been verified.        Objective   /76 (BP Location: Left arm, Patient Position: Sitting, Cuff Size: Standard)   Pulse 75   Temp 99.4 °F (37.4 °C) (Temporal)   Resp 18   Ht 5' 11\" (1.803 m)   Wt 61.2 kg (135 lb)   LMP 03/02/2025 (Approximate)   SpO2 98%   BMI 18.83 kg/m²        Physical Exam     Physical Exam  Vitals and nursing note reviewed.   Constitutional:       Appearance: Normal appearance. She is normal weight.   HENT:      Right Ear: Tympanic membrane normal.      Nose: Nose normal.      Mouth/Throat:      Mouth: Mucous membranes are moist.      Pharynx: Oropharynx is clear.   Eyes:      Extraocular Movements: Extraocular movements intact.      Conjunctiva/sclera: Conjunctivae normal.      Pupils: Pupils are equal, round, and reactive to light.   Cardiovascular:      Rate and Rhythm: Normal rate and regular rhythm.      Pulses: Normal pulses.      Heart sounds: Normal heart sounds.   Pulmonary:      Effort: Pulmonary effort is normal.      Breath sounds: Normal breath sounds.   Abdominal:      Palpations: Abdomen is soft.   Musculoskeletal: "         General: Normal range of motion.      Cervical back: Normal range of motion and neck supple.   Skin:     General: Skin is warm.      Capillary Refill: Capillary refill takes less than 2 seconds.      Comments: Right posterior neck  1cm swelling and slight erythema and tenderness  not fluctuant    Neurological:      General: No focal deficit present.      Mental Status: She is alert and oriented to person, place, and time.   Psychiatric:         Mood and Affect: Mood normal.

## 2025-03-18 ENCOUNTER — OFFICE VISIT (OUTPATIENT)
Dept: FAMILY MEDICINE CLINIC | Facility: CLINIC | Age: 30
End: 2025-03-18
Payer: COMMERCIAL

## 2025-03-18 VITALS
SYSTOLIC BLOOD PRESSURE: 106 MMHG | DIASTOLIC BLOOD PRESSURE: 80 MMHG | BODY MASS INDEX: 18.96 KG/M2 | OXYGEN SATURATION: 98 % | RESPIRATION RATE: 16 BRPM | HEART RATE: 72 BPM | HEIGHT: 71 IN | TEMPERATURE: 98.4 F | WEIGHT: 135.4 LBS

## 2025-03-18 DIAGNOSIS — L02.91 ABSCESS: ICD-10-CM

## 2025-03-18 DIAGNOSIS — H61.21 IMPACTED CERUMEN OF RIGHT EAR: ICD-10-CM

## 2025-03-18 DIAGNOSIS — Z00.00 ANNUAL PHYSICAL EXAM: Primary | ICD-10-CM

## 2025-03-18 PROCEDURE — 99214 OFFICE O/P EST MOD 30 MIN: CPT | Performed by: FAMILY MEDICINE

## 2025-03-18 PROCEDURE — 69210 REMOVE IMPACTED EAR WAX UNI: CPT | Performed by: FAMILY MEDICINE

## 2025-03-18 PROCEDURE — 99395 PREV VISIT EST AGE 18-39: CPT | Performed by: FAMILY MEDICINE

## 2025-03-18 PROCEDURE — 10060 I&D ABSCESS SIMPLE/SINGLE: CPT | Performed by: FAMILY MEDICINE

## 2025-03-18 NOTE — PROGRESS NOTES
Adult Annual Physical  Name: Whitney Gomes      : 1995      MRN: [id number]  Encounter Provider: Angelo Sr DO  Encounter Date: 3/18/2025   Encounter department: [facility], [place] operated [facility]    Assessment & Plan  Annual physical exam  Up to date on age appropriate screenings.        Abscess  Painful infected cyst for 2 weeks, was seen at urgent care 9 days ago, continues on course of Keflex. Only noticed very slight improvement in the size and pain associated with abscess. Tolerated I&D well today. Will complete course of Keflex and follow up if persistent beyond the course of antibiotics.   Orders:  •  Incision and Drainage    Impacted cerumen of right ear  Muffled hearing in the right ear. Wears ear piece for work. Impacted cerumen on exam, cleared with normal TM and hearing restored to normal after irrigation. Can use Debrox prn and alternate ears to avoid repeated compacting of normal ear wax.  Orders:  •  Ear cerumen removal    Preventive Screenings:    - Cervical cancer screening: screening up-to-date   - Colon cancer screening: screening not indicated   - Lung cancer screening: screening not indicated   - Prostate cancer screening: screening not indicated     Immunizations:  - Immunizations due: Influenza and Tdap    Counseling/Anticipatory Guidance:    - Dental health: discussed importance of regular tooth brushing, flossing, and dental visits.   - Diet: discussed recommendations for a healthy/well-balanced diet.   - Exercise: the importance of regular exercise/physical activity was discussed. Recommend exercise 3-5 times per week for at least 30 minutes.          History of Present Illness       Cyst on her back for several years, initially when it surfaced she needed antibiotics for infection. Was seen at urgent care about a week ago, on Keflex for almost a week now, has just started to improve.     Right ear a little muffled, wears an ear piece at work every night. Works in  "security at [employer]    Adult Annual Physical:  Patient presents for annual physical.     Diet and Physical Activity:  - Diet/Nutrition: well balanced diet.  - Exercise: moderate cardiovascular exercise.    Depression Screening:  - PHQ-2 Score: 0    General Health:    - Hearing:. right ear muffled hearing due to wax buildup  - Vision: wears glasses.  - Dental: regular dental visits.    /GYN Health:  - Follows with GYN: yes.     Review of Systems      Objective   /80 (BP Location: Right arm, Patient Position: Sitting, Cuff Size: Standard)   Pulse 72   Temp 98.4 °F (36.9 °C) (Temporal)   Resp 16   Ht 5' 11\" (1.803 m)   Wt 61.4 kg (135 lb 6.4 oz)   LMP 03/02/2025 (Approximate)   SpO2 98%   BMI 18.88 kg/m²     Physical Exam  Vitals reviewed.   Constitutional:       Appearance: Normal appearance.   HENT:      Right Ear: Tympanic membrane, ear canal and external ear normal. There is impacted cerumen.      Left Ear: Tympanic membrane, ear canal and external ear normal.   Eyes:      Extraocular Movements: Extraocular movements intact.      Conjunctiva/sclera: Conjunctivae normal.   Cardiovascular:      Rate and Rhythm: Normal rate and regular rhythm.      Heart sounds: Normal heart sounds.   Pulmonary:      Effort: Pulmonary effort is normal.      Breath sounds: Normal breath sounds.   Skin:     General: Skin is warm and dry.      Capillary Refill: Capillary refill takes less than 2 seconds.      Comments: 2 cm abscess on the right posterolateral neck   Neurological:      General: No focal deficit present.      Mental Status: She is alert and oriented to person, place, and time.       Ear cerumen removal    Date/Time: 3/18/2025 3:00 PM    Performed by: Angelo Sr DO  Authorized by: Angelo Sr DO  Universal Protocol:  Consent: Verbal consent obtained.  Patient understanding: patient states understanding of the procedure being performed    Patient location:  Clinic  Procedure details:     " Local anesthetic:  None    Location:  R ear    Procedure type: irrigation with instrumentation      Instrumentation: curette    Post-procedure details:     Complication:  None    Hearing quality:  Normal    Patient tolerance of procedure:  Tolerated well, no immediate complications  Incision and Drainage    Date/Time: 3/18/2025 3:00 PM    Performed by: Angelo Sr DO  Authorized by: Angelo Sr DO  Universal Protocol:  Consent given by: patient  Patient understanding: patient states understanding of the procedure being performed    Patient location:  Clinic  Location:     Type:  Abscess    Location:  Head/neck    Head/neck location:  Neck  Pre-procedure details:     Skin preparation:  Antiseptic wash  Anesthesia (see MAR for exact dosages):     Anesthesia method:  Local infiltration    Local anesthetic:  Lidocaine 2% w/o epi  Procedure details:     Complexity:  Simple    Incision types:  Stab incision    Drainage:  Bloody and purulent    Drainage amount:  Moderate    Wound treatment:  Wound left open    Packing materials:  None  Post-procedure details:     Patient tolerance of procedure:  Tolerated well, no immediate complications

## 2025-03-18 NOTE — ASSESSMENT & PLAN NOTE
Muffled hearing in the right ear. Wears ear piece for work. Impacted cerumen on exam, cleared with normal TM and hearing restored to normal after irrigation. Can use Debrox prn and alternate ears to avoid repeated compacting of normal ear wax.  Orders:  •  Ear cerumen removal

## 2025-03-18 NOTE — ASSESSMENT & PLAN NOTE
Painful infected cyst for 2 weeks, was seen at urgent care 9 days ago, continues on course of Keflex. Only noticed very slight improvement in the size and pain associated with abscess. Tolerated I&D well today. Will complete course of Keflex and follow up if persistent beyond the course of antibiotics.   Orders:  •  Incision and Drainage

## 2025-04-02 ENCOUNTER — OFFICE VISIT (OUTPATIENT)
Dept: FAMILY MEDICINE CLINIC | Facility: CLINIC | Age: 30
End: 2025-04-02
Payer: COMMERCIAL

## 2025-04-02 VITALS
WEIGHT: 136.6 LBS | HEART RATE: 69 BPM | TEMPERATURE: 98 F | HEIGHT: 71 IN | SYSTOLIC BLOOD PRESSURE: 116 MMHG | DIASTOLIC BLOOD PRESSURE: 82 MMHG | OXYGEN SATURATION: 98 % | RESPIRATION RATE: 16 BRPM | BODY MASS INDEX: 19.12 KG/M2

## 2025-04-02 DIAGNOSIS — B35.1 ONYCHOMYCOSIS: ICD-10-CM

## 2025-04-02 DIAGNOSIS — L03.039 PARONYCHIA OF GREAT TOE: Primary | ICD-10-CM

## 2025-04-02 PROCEDURE — 99213 OFFICE O/P EST LOW 20 MIN: CPT | Performed by: FAMILY MEDICINE

## 2025-04-02 RX ORDER — CEPHALEXIN 500 MG/1
500 CAPSULE ORAL 3 TIMES DAILY
Qty: 21 CAPSULE | Refills: 0 | Status: SHIPPED | OUTPATIENT
Start: 2025-04-02 | End: 2025-04-09

## 2025-04-02 NOTE — PROGRESS NOTES
"Name: Whitney Gomes      : 1995      MRN: [id number]  Encounter Provider: Angelo Sr DO  Encounter Date: 2025   Encounter department: [facility]  :  Assessment & Plan  Paronychia of great toe  Recommend Keflex and warm soaks, add Epsom salt to water. If not improved would recommend podiatry evaluation.  Orders:  •  cephalexin (KEFLEX) 500 mg capsule; Take 1 capsule (500 mg total) by mouth 3 (three) times a day for 7 days    Onychomycosis  Likely onychomycosis of the left great and second toe nail. Has been using antifungal polish without improvement. May need to complete oral antifungal if bothersome but will await resolution of paronychia given antibiotics. Follow up as needed.              History of Present Illness   HPI    Has issues with nails on left foot, some yellowing. Left great toe has redness and pain of the skin around the nail. Has been doing warm soaks in plain water with some improvement, sometimes has drainage that is purulent.    Review of Systems    Objective   /82 (BP Location: Left arm, Patient Position: Sitting, Cuff Size: Standard)   Pulse 69   Temp 98 °F (36.7 °C) (Temporal)   Resp 16   Ht 5' 11\" (1.803 m)   Wt 62 kg (136 lb 9.6 oz)   LMP 2025 (Approximate)   SpO2 98%   BMI 19.05 kg/m²      Physical Exam        "

## 2025-04-17 ENCOUNTER — PATIENT MESSAGE (OUTPATIENT)
Dept: FAMILY MEDICINE CLINIC | Facility: CLINIC | Age: 30
End: 2025-04-17

## 2025-04-17 DIAGNOSIS — L03.039 PARONYCHIA OF GREAT TOE: Primary | ICD-10-CM

## 2025-04-17 PROBLEM — H61.21 IMPACTED CERUMEN OF RIGHT EAR: Status: RESOLVED | Noted: 2025-03-18 | Resolved: 2025-04-17

## 2025-04-29 ENCOUNTER — OFFICE VISIT (OUTPATIENT)
Dept: PODIATRY | Facility: CLINIC | Age: 30
End: 2025-04-29
Payer: COMMERCIAL

## 2025-04-29 VITALS — BODY MASS INDEX: 19.04 KG/M2 | WEIGHT: 136 LBS | HEIGHT: 71 IN

## 2025-04-29 DIAGNOSIS — L03.032 CELLULITIS AND ABSCESS OF TOE, LEFT: Primary | ICD-10-CM

## 2025-04-29 DIAGNOSIS — L02.612 CELLULITIS AND ABSCESS OF TOE, LEFT: Primary | ICD-10-CM

## 2025-04-29 DIAGNOSIS — B35.1 ONYCHOMYCOSIS: ICD-10-CM

## 2025-04-29 DIAGNOSIS — M79.675 PAIN OF LEFT GREAT TOE: ICD-10-CM

## 2025-04-29 PROCEDURE — 11730 AVULSION NAIL PLATE SIMPLE 1: CPT | Performed by: PODIATRIST

## 2025-04-29 PROCEDURE — 99243 OFF/OP CNSLTJ NEW/EST LOW 30: CPT | Performed by: PODIATRIST

## 2025-04-29 NOTE — LETTER
April 29, 2025     Angelo Sr DO  [address] [facility] [address]    Patient: Whitney Gomes   YOB: 1995   Date of Visit: 4/29/2025       Dear Dr. Angelo Sr, :    Thank you for referring Whitney Gomes to me for evaluation. Below are my notes for this consultation.    If you have questions, please do not hesitate to call me. I look forward to following your patient along with you.         Sincerely,        Angelito Cuba DPM        CC: No Recipients    Angelito Cuba DPM  4/29/2025  6:10 PM  Sign when Signing Visit                 PATIENT:  Whitney Gomes  1995       ASSESSMENT:     1. Cellulitis and abscess of toe, left  Ambulatory Referral to Podiatry    Nail removal      2. Pain of left great toe        3. Onychomycosis  Hepatic function panel                PLAN:  1. Reviewed medical records.  Reviewed the note from PCP.  Reviewed the note from urgent center.  Patient was counseled and educated on the condition and the diagnosis.    2. The diagnosis, treatment options and prognosis were discussed with the patient.    3.  Discussed options with the patient.  The patient wished to proceed with total nail avulsion.  See procedure.    4.  Home care instructions were given to the patient including decreased activity, ice and home pain medication as needed for 3 days. Patient will also perform daily dressing changes until the surgical site is fully healed.    5. Finish oral antibiotics as prescribed.    6. Will address nail fungus in the next visit.  Will check LFT and consider oral medication.     7. Patient will return in 3 weeks for re-evaluation.       Imaging: I have personally reviewed pertinent films in PACS  Labs, pathology, and Other Studies: I have personally reviewed pertinent reports.      Nail removal    Date/Time: 4/29/2025 6:00 PM    Performed by: Angelito Cuba DPM  Authorized by: Angelito Cuba DPM    Patient location:  Clinic[facility] Protocol:  Consent: Verbal consent  "obtained.  Risks and benefits: risks, benefits and alternatives were discussed  Consent given by: patient  Time out: Immediately prior to procedure a \"time out\" was called to verify the correct patient, procedure, equipment, support staff and site/side marked as required.  Timeout called at: 4/29/2025 6:13 PM.  Patient understanding: patient states understanding of the procedure being performed  Site marked: the operative site was marked  Patient identity confirmed: verbally with patient    Location:     Foot:  L big toe  Pre-procedure details:     Skin preparation:  Betadine    Preparation: Patient was prepped and draped in the usual sterile fashion    Anesthesia (see MAR for exact dosages):     Anesthesia method:  Local infiltration    Local anesthetic:  Lidocaine 1% w/o epi  Nail Removal:     Nail removed:  Complete  Post-procedure details:     Dressing:  4x4 sterile gauze, antibiotic ointment and gauze roll    Patient tolerance of procedure:  Tolerated well, no immediate complications        Subjective:       HPI  The patient was referred to my office for infection in left great toe.  She had it for a couple of months.  It was getting worse with increased pain and redness and she went to urgent last month.  She also saw her PCP 4 weeks ago and was put on oral antibiotics.  It is little better, but still painful to touch with redness around the nail.  No associated numbness or paresthesia.  No significant weakness or dysfunction. She also has nail fungus.     The following portions of the patient's history were reviewed and updated as appropriate: allergies, current medications, past family history, past medical history, past social history, past surgical history and problem list.  All pertinent labs and images were reviewed.      Past Medical History  Past Medical History:   Diagnosis Date   • Onychomycosis        Past Surgical History  History reviewed. No pertinent surgical history.     Allergies:  Patient has " "no known allergies.    Medications:  Current Outpatient Medications   Medication Sig Dispense Refill   • dicyclomine (BENTYL) 10 mg capsule Take 1 capsule (10 mg total) by mouth 4 (four) times a day as needed (abdominal pain or diarrhea) 120 capsule 5   • nitrofurantoin (MACROBID) 100 mg capsule Take 1 capsule (100 mg total) by mouth 2 (two) times a day 6 capsule 0   • cholestyramine (QUESTRAN) 4 GM/DOSE powder Take 1 packet (4 g total) by mouth 2 (two) times a day with meals (Patient not taking: Reported on 4/28/2025) 60 packet 0     No current facility-administered medications for this visit.       Social History:  Social History     Socioeconomic History   • Marital status: Single     Spouse name: None   • Number of children: None   • Years of education: None   • Highest education level: None   Occupational History   • None   Tobacco Use   • Smoking status: Never   • Smokeless tobacco: Never   Vaping Use   • Vaping status: Never Used   Substance and Sexual Activity   • Alcohol use: Not Currently   • Drug use: Never   • Sexual activity: Yes     Partners: Male     Birth control/protection: Condom Male   Other Topics Concern   • None   Social History Narrative   • None     Social Drivers of Health     Financial Resource Strain: Not on file   Food Insecurity: Not on file   Transportation Needs: Not on file   Physical Activity: Not on file   Stress: Not on file   Social Connections: Not on file   Intimate Partner Violence: Not on file   Housing Stability: Not on file          Review of Systems   Constitutional:  Negative for chills and fever.   Respiratory:  Negative for cough and shortness of breath.    Cardiovascular:  Negative for chest pain.   Gastrointestinal:  Negative for nausea and vomiting.   Musculoskeletal:  Negative for gait problem.   Neurological:  Negative for weakness and numbness.         Objective:      Ht 5' 11\" (1.803 m)   Wt 61.7 kg (136 lb)   BMI 18.97 kg/m²          Physical Exam  Vitals " reviewed.   Constitutional:       General: She is not in acute distress.     Appearance: She is not toxic-appearing or diaphoretic.   HENT:      Head: Normocephalic and atraumatic.   Eyes:      Extraocular Movements: Extraocular movements intact.   Cardiovascular:      Rate and Rhythm: Normal rate and regular rhythm.      Pulses: Normal pulses.           Dorsalis pedis pulses are 2+ on the right side and 2+ on the left side.        Posterior tibial pulses are 2+ on the right side and 2+ on the left side.   Pulmonary:      Effort: Pulmonary effort is normal. No respiratory distress.   Musculoskeletal:         General: No swelling or signs of injury.      Cervical back: Normal range of motion and neck supple.      Right lower leg: No edema.      Left lower leg: No edema.      Right foot: No foot drop.      Left foot: No foot drop.   Skin:     General: Skin is warm.      Capillary Refill: Capillary refill takes less than 2 seconds.      Coloration: Skin is not cyanotic or mottled.      Findings: No abscess.      Nails: There is no clubbing.      Comments: Redness and swelling around proximal nail fold of left great toe with mild purulence.  Nail plate is little loose proximally.  Mild discoloration and hypertrophy of toenails, especially great toes.   Neurological:      General: No focal deficit present.      Mental Status: She is alert and oriented to person, place, and time.      Cranial Nerves: No cranial nerve deficit.      Sensory: No sensory deficit.      Motor: No weakness.      Coordination: Coordination normal.   Psychiatric:         Mood and Affect: Mood normal.         Behavior: Behavior normal.         Thought Content: Thought content normal.         Judgment: Judgment normal.

## 2025-04-29 NOTE — PROGRESS NOTES
"               PATIENT:  Whitney ROQUE Aungst  1995       ASSESSMENT:     1. Cellulitis and abscess of toe, left  Ambulatory Referral to Podiatry    Nail removal      2. Pain of left great toe        3. Onychomycosis  Hepatic function panel                PLAN:  1. Reviewed medical records.  Reviewed the note from PCP.  Reviewed the note from urgent center.  Patient was counseled and educated on the condition and the diagnosis.    2. The diagnosis, treatment options and prognosis were discussed with the patient.    3.  Discussed options with the patient.  The patient wished to proceed with total nail avulsion.  See procedure.    4.  Home care instructions were given to the patient including decreased activity, ice and home pain medication as needed for 3 days. Patient will also perform daily dressing changes until the surgical site is fully healed.    5. Finish oral antibiotics as prescribed.    6. Will address nail fungus in the next visit.  Will check LFT and consider oral medication.     7. Patient will return in 3 weeks for re-evaluation.       Imaging: I have personally reviewed pertinent films in PACS  Labs, pathology, and Other Studies: I have personally reviewed pertinent reports.      Nail removal    Date/Time: 4/29/2025 6:00 PM    Performed by: Angelito Cuba DPM  Authorized by: Angelito Cuba DPM    Patient location:  ClinicUniversal Protocol:  Consent: Verbal consent obtained.  Risks and benefits: risks, benefits and alternatives were discussed  Consent given by: patient  Time out: Immediately prior to procedure a \"time out\" was called to verify the correct patient, procedure, equipment, support staff and site/side marked as required.  Timeout called at: 4/29/2025 6:13 PM.  Patient understanding: patient states understanding of the procedure being performed  Site marked: the operative site was marked  Patient identity confirmed: verbally with patient    Location:     Foot:  L big toe  Pre-procedure details:     " Skin preparation:  Betadine    Preparation: Patient was prepped and draped in the usual sterile fashion    Anesthesia (see MAR for exact dosages):     Anesthesia method:  Local infiltration    Local anesthetic:  Lidocaine 1% w/o epi  Nail Removal:     Nail removed:  Complete  Post-procedure details:     Dressing:  4x4 sterile gauze, antibiotic ointment and gauze roll    Patient tolerance of procedure:  Tolerated well, no immediate complications        Subjective:       HPI  The patient was referred to my office for infection in left great toe.  She had it for a couple of months.  It was getting worse with increased pain and redness and she went to urgent last month.  She also saw her PCP 4 weeks ago and was put on oral antibiotics.  It is little better, but still painful to touch with redness around the nail.  No associated numbness or paresthesia.  No significant weakness or dysfunction. She also has nail fungus.     The following portions of the patient's history were reviewed and updated as appropriate: allergies, current medications, past family history, past medical history, past social history, past surgical history and problem list.  All pertinent labs and images were reviewed.      Past Medical History  Past Medical History:   Diagnosis Date    Onychomycosis        Past Surgical History  History reviewed. No pertinent surgical history.     Allergies:  Patient has no known allergies.    Medications:  Current Outpatient Medications   Medication Sig Dispense Refill    dicyclomine (BENTYL) 10 mg capsule Take 1 capsule (10 mg total) by mouth 4 (four) times a day as needed (abdominal pain or diarrhea) 120 capsule 5    nitrofurantoin (MACROBID) 100 mg capsule Take 1 capsule (100 mg total) by mouth 2 (two) times a day 6 capsule 0    cholestyramine (QUESTRAN) 4 GM/DOSE powder Take 1 packet (4 g total) by mouth 2 (two) times a day with meals (Patient not taking: Reported on 4/28/2025) 60 packet 0     No current  "facility-administered medications for this visit.       Social History:  Social History     Socioeconomic History    Marital status: Single     Spouse name: None    Number of children: None    Years of education: None    Highest education level: None   Occupational History    None   Tobacco Use    Smoking status: Never    Smokeless tobacco: Never   Vaping Use    Vaping status: Never Used   Substance and Sexual Activity    Alcohol use: Not Currently    Drug use: Never    Sexual activity: Yes     Partners: Male     Birth control/protection: Condom Male   Other Topics Concern    None   Social History Narrative    None     Social Drivers of Health     Financial Resource Strain: Not on file   Food Insecurity: Not on file   Transportation Needs: Not on file   Physical Activity: Not on file   Stress: Not on file   Social Connections: Not on file   Intimate Partner Violence: Not on file   Housing Stability: Not on file          Review of Systems   Constitutional:  Negative for chills and fever.   Respiratory:  Negative for cough and shortness of breath.    Cardiovascular:  Negative for chest pain.   Gastrointestinal:  Negative for nausea and vomiting.   Musculoskeletal:  Negative for gait problem.   Neurological:  Negative for weakness and numbness.         Objective:      Ht 5' 11\" (1.803 m)   Wt 61.7 kg (136 lb)   BMI 18.97 kg/m²          Physical Exam  Vitals reviewed.   Constitutional:       General: She is not in acute distress.     Appearance: She is not toxic-appearing or diaphoretic.   HENT:      Head: Normocephalic and atraumatic.   Eyes:      Extraocular Movements: Extraocular movements intact.   Cardiovascular:      Rate and Rhythm: Normal rate and regular rhythm.      Pulses: Normal pulses.           Dorsalis pedis pulses are 2+ on the right side and 2+ on the left side.        Posterior tibial pulses are 2+ on the right side and 2+ on the left side.   Pulmonary:      Effort: Pulmonary effort is normal. No " respiratory distress.   Musculoskeletal:         General: No swelling or signs of injury.      Cervical back: Normal range of motion and neck supple.      Right lower leg: No edema.      Left lower leg: No edema.      Right foot: No foot drop.      Left foot: No foot drop.   Skin:     General: Skin is warm.      Capillary Refill: Capillary refill takes less than 2 seconds.      Coloration: Skin is not cyanotic or mottled.      Findings: No abscess.      Nails: There is no clubbing.      Comments: Redness and swelling around proximal nail fold of left great toe with mild purulence.  Nail plate is little loose proximally.  Mild discoloration and hypertrophy of toenails, especially great toes.   Neurological:      General: No focal deficit present.      Mental Status: She is alert and oriented to person, place, and time.      Cranial Nerves: No cranial nerve deficit.      Sensory: No sensory deficit.      Motor: No weakness.      Coordination: Coordination normal.   Psychiatric:         Mood and Affect: Mood normal.         Behavior: Behavior normal.         Thought Content: Thought content normal.         Judgment: Judgment normal.

## 2025-04-30 ENCOUNTER — TELEPHONE (OUTPATIENT)
Age: 30
End: 2025-04-30

## 2025-04-30 NOTE — TELEPHONE ENCOUNTER
Caller: Whitney     Doctor and/or Office: Dr. Cuba     #: [phone]    Escalation: Care Asked if she can have a  note to excuse her from work today  she starts at 11:00 pm .  Thank you

## 2025-05-15 ENCOUNTER — APPOINTMENT (OUTPATIENT)
Dept: LAB | Facility: CLINIC | Age: 30
End: 2025-05-15
Payer: COMMERCIAL

## 2025-05-15 DIAGNOSIS — B35.1 ONYCHOMYCOSIS: ICD-10-CM

## 2025-05-15 LAB
ALBUMIN SERPL BCG-MCNC: 4.8 G/DL (ref 3.5–5)
ALP SERPL-CCNC: 55 U/L (ref 34–104)
ALT SERPL W P-5'-P-CCNC: 13 U/L (ref 7–52)
AST SERPL W P-5'-P-CCNC: 15 U/L (ref 13–39)
BILIRUB DIRECT SERPL-MCNC: 0.1 MG/DL (ref 0–0.2)
BILIRUB SERPL-MCNC: 0.53 MG/DL (ref 0.2–1)
PROT SERPL-MCNC: 7.5 G/DL (ref 6.4–8.4)

## 2025-05-15 PROCEDURE — 80076 HEPATIC FUNCTION PANEL: CPT

## 2025-05-15 PROCEDURE — 36415 COLL VENOUS BLD VENIPUNCTURE: CPT

## 2025-05-20 ENCOUNTER — OFFICE VISIT (OUTPATIENT)
Dept: PODIATRY | Facility: CLINIC | Age: 30
End: 2025-05-20
Payer: COMMERCIAL

## 2025-05-20 VITALS — WEIGHT: 140 LBS | HEIGHT: 71 IN | BODY MASS INDEX: 19.6 KG/M2

## 2025-05-20 DIAGNOSIS — L02.612 CELLULITIS AND ABSCESS OF TOE, LEFT: ICD-10-CM

## 2025-05-20 DIAGNOSIS — L03.032 CELLULITIS AND ABSCESS OF TOE, LEFT: ICD-10-CM

## 2025-05-20 DIAGNOSIS — B35.1 ONYCHOMYCOSIS: Primary | ICD-10-CM

## 2025-05-20 PROCEDURE — 99213 OFFICE O/P EST LOW 20 MIN: CPT | Performed by: PODIATRIST

## 2025-05-20 RX ORDER — TERBINAFINE HYDROCHLORIDE 250 MG/1
250 TABLET ORAL DAILY
Qty: 28 TABLET | Refills: 2 | Status: SHIPPED | OUTPATIENT
Start: 2025-05-20 | End: 2025-08-12

## 2025-05-20 NOTE — PROGRESS NOTES
PATIENT:  Whitney ROQUE Aungst  1995       ASSESSMENT:     1. Onychomycosis  terbinafine (LamISIL) 250 mg tablet    Hepatic function panel      2. Cellulitis and abscess of toe, left                  PLAN:  1. Reviewed medical records.  Patient was counseled and educated on the condition and the diagnosis.    2. The diagnosis, treatment options and prognosis were discussed with the patient.    3.  Reviewed the lab and LFT was WNL.  Start her on oral Terbinafine.  Discussed possible risks and side effects.  No alcohol while she is on Lamisil.  Repeat LFT in 6 weeks.    4.  Pt to use OTC Lamisil AT cream bid to the nail bed of left great toe.  5. RA in 6 months.        Imaging: I have personally reviewed pertinent films in PACS  Labs, pathology, and Other Studies: I have personally reviewed pertinent reports.      Procedures      Subjective:       HPI  The patient presents for follow-up on infection of left great toe.  She feels well and pain resolved.  No new complaint.      The following portions of the patient's history were reviewed and updated as appropriate: allergies, current medications, past family history, past medical history, past social history, past surgical history and problem list.  All pertinent labs and images were reviewed.      Past Medical History  Past Medical History:   Diagnosis Date    Onychomycosis        Past Surgical History  History reviewed. No pertinent surgical history.     Allergies:  Patient has no known allergies.    Medications:  Current Outpatient Medications   Medication Sig Dispense Refill    cholestyramine (QUESTRAN) 4 GM/DOSE powder Take 1 packet (4 g total) by mouth 2 (two) times a day with meals 60 packet 0    dicyclomine (BENTYL) 10 mg capsule Take 1 capsule (10 mg total) by mouth 4 (four) times a day as needed (abdominal pain or diarrhea) 120 capsule 5    nitrofurantoin (MACROBID) 100 mg capsule Take 1 capsule (100 mg total) by mouth 2 (two) times a day 6  "capsule 0    terbinafine (LamISIL) 250 mg tablet Take 1 tablet (250 mg total) by mouth daily 28 tablet 2     No current facility-administered medications for this visit.       Social History:  Social History     Socioeconomic History    Marital status: Single     Spouse name: None    Number of children: None    Years of education: None    Highest education level: None   Occupational History    None   Tobacco Use    Smoking status: Never    Smokeless tobacco: Never   Vaping Use    Vaping status: Never Used   Substance and Sexual Activity    Alcohol use: Not Currently    Drug use: Never    Sexual activity: Yes     Partners: Male     Birth control/protection: Condom Male   Other Topics Concern    None   Social History Narrative    None     Social Drivers of Health     Financial Resource Strain: Not on file   Food Insecurity: Not on file   Transportation Needs: Not on file   Physical Activity: Not on file   Stress: Not on file   Social Connections: Not on file   Intimate Partner Violence: Not on file   Housing Stability: Not on file          Review of Systems   Constitutional:  Negative for chills and fever.   Respiratory:  Negative for cough and shortness of breath.    Cardiovascular:  Negative for chest pain.   Gastrointestinal:  Negative for nausea and vomiting.   Musculoskeletal:  Negative for gait problem.   Neurological:  Negative for weakness and numbness.         Objective:      Ht 5' 11\" (1.803 m)   Wt 63.5 kg (140 lb)   BMI 19.53 kg/m²          Physical Exam  Vitals reviewed.   Constitutional:       General: She is not in acute distress.     Appearance: She is not toxic-appearing or diaphoretic.     Cardiovascular:      Rate and Rhythm: Normal rate and regular rhythm.      Pulses: Normal pulses.           Dorsalis pedis pulses are 2+ on the right side and 2+ on the left side.        Posterior tibial pulses are 2+ on the right side and 2+ on the left side.   Pulmonary:      Effort: Pulmonary effort is normal. " No respiratory distress.     Musculoskeletal:         General: No swelling or signs of injury.      Right lower leg: No edema.      Left lower leg: No edema.      Right foot: No foot drop.      Left foot: No foot drop.     Skin:     General: Skin is warm.      Capillary Refill: Capillary refill takes less than 2 seconds.      Coloration: Skin is not cyanotic or mottled.      Findings: No abscess.      Nails: There is no clubbing.      Comments: Redness and swelling resolved left great toe.  Nail bed is healed.  Mild discoloration and hypertrophy of toenails.     Neurological:      General: No focal deficit present.      Mental Status: She is alert and oriented to person, place, and time.      Cranial Nerves: No cranial nerve deficit.      Sensory: No sensory deficit.      Motor: No weakness.      Coordination: Coordination normal.     Psychiatric:         Mood and Affect: Mood normal.         Behavior: Behavior normal.         Thought Content: Thought content normal.         Judgment: Judgment normal.

## 2025-08-13 DIAGNOSIS — B35.1 ONYCHOMYCOSIS: ICD-10-CM

## 2025-08-20 RX ORDER — TERBINAFINE HYDROCHLORIDE 250 MG/1
250 TABLET ORAL DAILY
Qty: 28 TABLET | Refills: 0 | Status: SHIPPED | OUTPATIENT
Start: 2025-08-20 | End: 2025-09-17